# Patient Record
Sex: MALE | Race: WHITE | NOT HISPANIC OR LATINO | Employment: FULL TIME | URBAN - METROPOLITAN AREA
[De-identification: names, ages, dates, MRNs, and addresses within clinical notes are randomized per-mention and may not be internally consistent; named-entity substitution may affect disease eponyms.]

---
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
## 2019-11-01 ENCOUNTER — OFFICE VISIT (OUTPATIENT)
Dept: FAMILY MEDICINE CLINIC | Facility: CLINIC | Age: 19
End: 2019-11-01

## 2019-11-01 VITALS
SYSTOLIC BLOOD PRESSURE: 154 MMHG | DIASTOLIC BLOOD PRESSURE: 50 MMHG | BODY MASS INDEX: 27.19 KG/M2 | HEART RATE: 80 BPM | TEMPERATURE: 97.7 F | WEIGHT: 235 LBS | RESPIRATION RATE: 16 BRPM | HEIGHT: 78 IN

## 2019-11-01 DIAGNOSIS — R09.89 WIDENED PULSE PRESSURE: ICD-10-CM

## 2019-11-01 DIAGNOSIS — I10 HYPERTENSION, UNSPECIFIED TYPE: Primary | ICD-10-CM

## 2019-11-01 PROCEDURE — 99214 OFFICE O/P EST MOD 30 MIN: CPT | Performed by: FAMILY MEDICINE

## 2019-11-01 RX ORDER — LISINOPRIL 10 MG/1
10 TABLET ORAL DAILY
Qty: 90 TABLET | Refills: 1 | Status: SHIPPED | OUTPATIENT
Start: 2019-11-01 | End: 2019-11-25

## 2019-11-01 NOTE — PROGRESS NOTES
Chief Complaint   Patient presents with   • Hypertension     HPI  Ashok Vinson is a 19 y.o. male that presents for hypertension. Elevated BP had been noted by neurologist and he was told to monitor this. He has been taking his BP at home and is typically 150-160/60-70. He is otherwise asymptomatic and has no complaints. Maternal grandmother w/ high blood pressure.     Review of Systems   Constitutional: Negative for chills, fever and unexpected weight loss.   HENT: Negative for congestion, rhinorrhea and sore throat.    Eyes: Negative for blurred vision, double vision and visual disturbance.   Respiratory: Negative for cough and shortness of breath.    Cardiovascular: Negative for chest pain, palpitations and leg swelling.   Gastrointestinal: Negative for abdominal pain, constipation, diarrhea, nausea and vomiting.   Endocrine: Negative for cold intolerance and heat intolerance.   Genitourinary: Negative for dysuria, frequency and urgency.   Musculoskeletal: Negative for arthralgias and joint swelling.   Skin: Negative for rash and skin lesions.   Allergic/Immunologic: Negative for environmental allergies and food allergies.   Neurological: Negative for weakness and headache.   Hematological: Negative for adenopathy. Does not bruise/bleed easily.   Psychiatric/Behavioral: Negative for depressed mood. The patient is not nervous/anxious.      The following portions of the patient's history were reviewed and updated as appropriate: problem list, past medical history, past surgical history, allergies, current medications, past social history and past family history.    Problem List Tab  Patient History Tab  Immunizations Tab  Medications Tab  Chart Review Tab  Care Everywhere Tab  Synopsis Tab    PE  Vitals:    11/01/19 1628   BP: 154/50   Pulse: 80   Resp: 16   Temp: 97.7 °F (36.5 °C)     General: Well nourished, NAD  Head: AT/NC  Eyes: EOMI, anicteric sclera  ENT: MMM w/o erythema  Neck: Supple  Resp: CTAB, SCR,  BS equal  CV: RRR w/o m/r/g; 2+ pulses. No abdominal bruit  GI: Soft, NT/ND, +BS  MSK: FROM, no deformity, no edema  Skin: Warm, dry, intact. Numerous cafe au lait spots  Neuro: Alert and oriented. No focal deficits  Psych: Appropriate mood and affect    Imaging  No Images in the past 120 days found..    Assessment/Plan   Ashok Vinson is a 19 y.o. male that presents for   Chief Complaint   Patient presents with   • Hypertension     Ashok was seen today for hypertension.    Diagnoses and all orders for this visit:    Hypertension, unspecified type: Blood pressure 154/50 today.  This could represent essential hypertension.  However with the patient's history of NF1, he is at increased risk for renal artery stenosis and pheochromocytoma.  We will further assess for these issues.  We will also obtain BMP today as there is no baseline creatinine on file nor electrolytes.  -     lisinopril (PRINIVIL,ZESTRIL) 10 MG tablet; Take 1 tablet by mouth Daily.  -     Metanephrines, Pheochromocyt - Urine, Clean Catch  -     Basic Metabolic Panel  -     Duplex Renal Artery - Bilateral Complete CAR    Widened pulse pressure: Blood pressure 154/50 today with pulse pressure greater than 100, which is unusual for age.  Unclear etiology.  Briefly discussed with cardiology and feel that baseline echocardiogram would be appropriate.  -     Adult Transthoracic Echo Complete W/ Cont if Necessary Per Protocol; Future    F/U BP check in 1 week

## 2019-11-08 ENCOUNTER — HOSPITAL ENCOUNTER (OUTPATIENT)
Dept: CARDIOLOGY | Facility: HOSPITAL | Age: 19
Discharge: HOME | End: 2019-11-08
Admitting: FAMILY MEDICINE

## 2019-11-08 ENCOUNTER — HOSPITAL ENCOUNTER (OUTPATIENT)
Dept: CARDIOLOGY | Facility: HOSPITAL | Age: 19
Discharge: HOME | End: 2019-11-08

## 2019-11-08 VITALS
SYSTOLIC BLOOD PRESSURE: 172 MMHG | DIASTOLIC BLOOD PRESSURE: 69 MMHG | WEIGHT: 235 LBS | HEIGHT: 77 IN | BODY MASS INDEX: 27.75 KG/M2

## 2019-11-08 DIAGNOSIS — R09.89 WIDENED PULSE PRESSURE: ICD-10-CM

## 2019-11-08 LAB
BH CV ECHO MEAS - ACS: 2.4 CM
BH CV ECHO MEAS - AO MAX PG (FULL): 3.8 MMHG
BH CV ECHO MEAS - AO MAX PG: 5.8 MMHG
BH CV ECHO MEAS - AO MEAN PG (FULL): 2.5 MMHG
BH CV ECHO MEAS - AO MEAN PG: 3.5 MMHG
BH CV ECHO MEAS - AO ROOT AREA (BSA CORRECTED): 1.3
BH CV ECHO MEAS - AO ROOT AREA: 7.8 CM^2
BH CV ECHO MEAS - AO ROOT DIAM: 3.1 CM
BH CV ECHO MEAS - AO V2 MAX: 120.1 CM/SEC
BH CV ECHO MEAS - AO V2 MEAN: 88.7 CM/SEC
BH CV ECHO MEAS - AO V2 VTI: 22.3 CM
BH CV ECHO MEAS - AORTIC HR: 236.2 BPM
BH CV ECHO MEAS - AORTIC R-R: 0.25 SEC
BH CV ECHO MEAS - ASC AORTA: 2.5 CM
BH CV ECHO MEAS - AVA(I,A): 3.8 CM^2
BH CV ECHO MEAS - AVA(I,D): 3.8 CM^2
BH CV ECHO MEAS - AVA(V,A): 3.9 CM^2
BH CV ECHO MEAS - AVA(V,D): 3.9 CM^2
BH CV ECHO MEAS - BSA(HAYCOCK): 2.4 M^2
BH CV ECHO MEAS - BSA: 2.4 M^2
BH CV ECHO MEAS - BZI_BMI: 27.9 KILOGRAMS/M^2
BH CV ECHO MEAS - BZI_METRIC_HEIGHT: 195.6 CM
BH CV ECHO MEAS - BZI_METRIC_WEIGHT: 106.6 KG
BH CV ECHO MEAS - CI(AO): 17.1 L/MIN/M^2
BH CV ECHO MEAS - CI(LVOT): 8.3 L/MIN/M^2
BH CV ECHO MEAS - CO(AO): 41 L/MIN
BH CV ECHO MEAS - CO(LVOT): 19.9 L/MIN
BH CV ECHO MEAS - DIST REN A PSV LEFT: 98 CM/S
BH CV ECHO MEAS - EDV(CUBED): 132.3 ML
BH CV ECHO MEAS - EDV(MOD-SP2): 159.9 ML
BH CV ECHO MEAS - EDV(MOD-SP4): 170.2 ML
BH CV ECHO MEAS - EDV(TEICH): 123.6 ML
BH CV ECHO MEAS - EF(CUBED): 55.7 %
BH CV ECHO MEAS - EF(MOD-BP): 48 %
BH CV ECHO MEAS - EF(MOD-SP2): 45.2 %
BH CV ECHO MEAS - EF(MOD-SP4): 55.1 %
BH CV ECHO MEAS - EF(TEICH): 47.1 %
BH CV ECHO MEAS - ESV(CUBED): 58.7 ML
BH CV ECHO MEAS - ESV(MOD-SP2): 87.6 ML
BH CV ECHO MEAS - ESV(MOD-SP4): 76.4 ML
BH CV ECHO MEAS - ESV(TEICH): 65.3 ML
BH CV ECHO MEAS - FS: 23.7 %
BH CV ECHO MEAS - IVS/LVPW: 1
BH CV ECHO MEAS - IVSD: 1.1 CM
BH CV ECHO MEAS - LA DIMENSION(2D): 3.7 CM
BH CV ECHO MEAS - LV DIASTOLIC VOL/BSA (35-75): 71.1 ML/M^2
BH CV ECHO MEAS - LV MASS(C)D: 217.3 GRAMS
BH CV ECHO MEAS - LV MASS(C)DI: 90.7 GRAMS/M^2
BH CV ECHO MEAS - LV MAX PG: 1.9 MMHG
BH CV ECHO MEAS - LV MEAN PG: 1 MMHG
BH CV ECHO MEAS - LV SYSTOLIC VOL/BSA (12-30): 31.9 ML/M^2
BH CV ECHO MEAS - LV V1 MAX: 69.6 CM/SEC
BH CV ECHO MEAS - LV V1 MEAN: 47.6 CM/SEC
BH CV ECHO MEAS - LV V1 VTI: 12.6 CM
BH CV ECHO MEAS - LVIDD: 5.1 CM
BH CV ECHO MEAS - LVIDS: 3.9 CM
BH CV ECHO MEAS - LVOT AREA: 6.7 CM^2
BH CV ECHO MEAS - LVOT DIAM: 2.9 CM
BH CV ECHO MEAS - LVPWD: 1.1 CM
BH CV ECHO MEAS - MID REN A PSV LEFT: 85 CM/S
BH CV ECHO MEAS - MV A MAX VEL: 52.1 CM/SEC
BH CV ECHO MEAS - MV DEC SLOPE: 327 CM/SEC^2
BH CV ECHO MEAS - MV DEC TIME: 0.21 SEC
BH CV ECHO MEAS - MV E MAX VEL: 68.9 CM/SEC
BH CV ECHO MEAS - MV E/A: 1.3
BH CV ECHO MEAS - MV MAX PG: 2.5 MMHG
BH CV ECHO MEAS - MV MEAN PG: 0.72 MMHG
BH CV ECHO MEAS - MV V2 MAX: 78.7 CM/SEC
BH CV ECHO MEAS - MV V2 MEAN: 36.4 CM/SEC
BH CV ECHO MEAS - MV V2 VTI: 14.6 CM
BH CV ECHO MEAS - MVA(VTI): 5.8 CM^2
BH CV ECHO MEAS - PA ACC TIME: 0.08 SEC
BH CV ECHO MEAS - PA MAX PG (FULL): 1.9 MMHG
BH CV ECHO MEAS - PA MAX PG: 5.4 MMHG
BH CV ECHO MEAS - PA PR(ACCEL): 43 MMHG
BH CV ECHO MEAS - PA V2 MAX: 116.7 CM/SEC
BH CV ECHO MEAS - PROX REN A PSV LEFT: 110 CM/S
BH CV ECHO MEAS - PULM A REVS VEL: 27.9 CM/SEC
BH CV ECHO MEAS - PULM DIAS VEL: 52 CM/SEC
BH CV ECHO MEAS - PULM S/D: 0.84
BH CV ECHO MEAS - PULM SYS VEL: 43.6 CM/SEC
BH CV ECHO MEAS - PVA(V,A): 4.4 CM^2
BH CV ECHO MEAS - PVA(V,D): 4.4 CM^2
BH CV ECHO MEAS - QP/QS: 1.3
BH CV ECHO MEAS - RAP SYSTOLE: 8 MMHG
BH CV ECHO MEAS - RV MAX PG: 3.6 MMHG
BH CV ECHO MEAS - RV MEAN PG: 1.5 MMHG
BH CV ECHO MEAS - RV V1 MAX: 94.7 CM/SEC
BH CV ECHO MEAS - RV V1 MEAN: 56.2 CM/SEC
BH CV ECHO MEAS - RV V1 VTI: 19.7 CM
BH CV ECHO MEAS - RVDD: 3.1 CM
BH CV ECHO MEAS - RVOT AREA: 5.4 CM^2
BH CV ECHO MEAS - RVOT DIAM: 2.6 CM
BH CV ECHO MEAS - RVSP: 25.5 MMHG
BH CV ECHO MEAS - SI(AO): 72.5 ML/M^2
BH CV ECHO MEAS - SI(CUBED): 30.7 ML/M^2
BH CV ECHO MEAS - SI(LVOT): 35.2 ML/M^2
BH CV ECHO MEAS - SI(MOD-SP2): 30.2 ML/M^2
BH CV ECHO MEAS - SI(MOD-SP4): 39.2 ML/M^2
BH CV ECHO MEAS - SI(TEICH): 24.3 ML/M^2
BH CV ECHO MEAS - SV(AO): 173.6 ML
BH CV ECHO MEAS - SV(CUBED): 73.6 ML
BH CV ECHO MEAS - SV(LVOT): 84.2 ML
BH CV ECHO MEAS - SV(MOD-SP2): 72.3 ML
BH CV ECHO MEAS - SV(MOD-SP4): 93.9 ML
BH CV ECHO MEAS - SV(RVOT): 105.8 ML
BH CV ECHO MEAS - SV(TEICH): 58.2 ML
BH CV ECHO MEAS - TR MAX VEL: 209 CM/SEC
BH CV VAS RENAL AORTIC MID PSV: 109 CM/S
BH CV XLRA MEAS - KID L LEFT: 10.9 CM
BH CV XLRA MEAS DIST REN A PSV RIGHT: 198 CM/S
BH CV XLRA MEAS KID L RIGHT: 11.3 CM
BH CV XLRA MEAS MID REN A PSV RIGHT: 88 CM/S
BH CV XLRA MEAS PROX REN A PSV RIGHT: 71 CM/S
LV EF 2D ECHO EST: 60 %

## 2019-11-08 PROCEDURE — 93306 TTE W/DOPPLER COMPLETE: CPT | Performed by: INTERNAL MEDICINE

## 2019-11-08 PROCEDURE — 93975 VASCULAR STUDY: CPT

## 2019-11-08 PROCEDURE — 0399T HC MYOCARDL STRAIN IMAG QUAN ASSMT PER SESS: CPT

## 2019-11-08 PROCEDURE — 93306 TTE W/DOPPLER COMPLETE: CPT

## 2019-11-18 PROCEDURE — 83835 ASSAY OF METANEPHRINES: CPT | Performed by: FAMILY MEDICINE

## 2019-11-18 PROCEDURE — 80048 BASIC METABOLIC PNL TOTAL CA: CPT | Performed by: FAMILY MEDICINE

## 2019-11-18 PROCEDURE — 82570 ASSAY OF URINE CREATININE: CPT | Performed by: FAMILY MEDICINE

## 2019-11-19 LAB
ANION GAP SERPL CALCULATED.3IONS-SCNC: 10.8 MMOL/L (ref 5–15)
BUN BLD-MCNC: 16 MG/DL (ref 6–20)
BUN/CREAT SERPL: 20.3 (ref 7–25)
CALCIUM SPEC-SCNC: 9.3 MG/DL (ref 8.6–10.5)
CHLORIDE SERPL-SCNC: 104 MMOL/L (ref 98–107)
CO2 SERPL-SCNC: 27.2 MMOL/L (ref 22–29)
CREAT BLD-MCNC: 0.79 MG/DL (ref 0.76–1.27)
GFR SERPL CREATININE-BSD FRML MDRD: 124 ML/MIN/1.73
GLUCOSE BLD-MCNC: 56 MG/DL (ref 65–99)
POTASSIUM BLD-SCNC: 4.1 MMOL/L (ref 3.5–5.2)
SODIUM BLD-SCNC: 142 MMOL/L (ref 136–145)

## 2019-11-20 ENCOUNTER — TELEPHONE (OUTPATIENT)
Dept: FAMILY MEDICINE CLINIC | Facility: CLINIC | Age: 19
End: 2019-11-20

## 2019-11-20 NOTE — TELEPHONE ENCOUNTER
VM MESSAGE.  Марина Vinson, stepmother, called and is asking if you've had time to review patient's renal doppler he had done 1.5 weeks ago. She states it shows renal artery stenosis. She is asking if you would order an MRA renal arteries to have done ASAP so that you would have results for patient's appointment with you next Monday, 11/25/19.

## 2019-11-25 ENCOUNTER — OFFICE VISIT (OUTPATIENT)
Dept: FAMILY MEDICINE CLINIC | Facility: CLINIC | Age: 19
End: 2019-11-25

## 2019-11-25 VITALS
SYSTOLIC BLOOD PRESSURE: 156 MMHG | BODY MASS INDEX: 28.46 KG/M2 | HEIGHT: 77 IN | HEART RATE: 76 BPM | TEMPERATURE: 98.1 F | RESPIRATION RATE: 16 BRPM | WEIGHT: 241 LBS | DIASTOLIC BLOOD PRESSURE: 58 MMHG

## 2019-11-25 DIAGNOSIS — I15.0 RENOVASCULAR HYPERTENSION: Primary | ICD-10-CM

## 2019-11-25 DIAGNOSIS — I10 HYPERTENSION, UNSPECIFIED TYPE: ICD-10-CM

## 2019-11-25 LAB
CREAT 24H UR-MCNC: 154 MG/DL
METANEPH/CREAT UR: 0.3 UG/MG CREAT (ref 0–1)
METANEPHS UR-MCNC: 83 UG/L
NORMETANEPHRINE 24H UR-MCNC: 290 UG/L

## 2019-11-25 PROCEDURE — 99214 OFFICE O/P EST MOD 30 MIN: CPT | Performed by: FAMILY MEDICINE

## 2019-11-25 RX ORDER — LISINOPRIL 10 MG/1
20 TABLET ORAL DAILY
Qty: 90 TABLET | Refills: 1 | Status: SHIPPED | OUTPATIENT
Start: 2019-11-25 | End: 2019-12-20 | Stop reason: SDUPTHER

## 2019-11-25 NOTE — PROGRESS NOTES
Chief Complaint   Patient presents with   • Hypertension     HPI  Ashok Vinson is a 19 y.o. male that presents for follow-up of hypertension.  Patient has been compliant with lisinopril 10 mg daily.  His repeat blood pressure today is 156/58.  Patient's only concern today are questions regarding his renal duplex results as well as echocardiogram.  Results explained see discussion below      Review of Systems   Constitutional: Negative for chills, fever and unexpected weight loss.   HENT: Negative for congestion, rhinorrhea and sore throat.    Eyes: Negative for blurred vision, double vision and visual disturbance.   Respiratory: Negative for cough and shortness of breath.    Cardiovascular: Negative for chest pain, palpitations and leg swelling.   Gastrointestinal: Negative for abdominal pain, constipation, diarrhea, nausea and vomiting.   Endocrine: Negative for cold intolerance and heat intolerance.   Genitourinary: Negative for dysuria, frequency and urgency.   Musculoskeletal: Negative for arthralgias and joint swelling.   Skin: Negative for rash and skin lesions.   Allergic/Immunologic: Negative for environmental allergies and food allergies.   Neurological: Negative for weakness and headache.   Hematological: Negative for adenopathy. Does not bruise/bleed easily.   Psychiatric/Behavioral: Negative for depressed mood. The patient is not nervous/anxious.      The following portions of the patient's history were reviewed and updated as appropriate: problem list, past medical history, past surgical history, allergies, current medications, past social history and past family history.    Problem List Tab  Patient History Tab  Immunizations Tab  Medications Tab  Chart Review Tab  Care Everywhere Tab  Synopsis Tab    PE  Vitals:    11/25/19 1610   BP: 156/58   Pulse: 76   Resp: 16   Temp: 98.1 °F (36.7 °C)     Body mass index is 28.58 kg/m².  General: Well nourished, NAD  Head: AT/NC  Eyes: EOMI, anicteric  sclera  ENT: MMM w/o erythema  Neck: Supple, no thyromegaly or LAD  Resp: CTAB, SCR, BS equal  CV: RRR w/o m/r/g; 2+ pulses  GI: Soft, NT/ND, +BS  MSK: FROM, no deformity, no edema  Skin: Warm, dry, intact  Neuro: Alert and oriented. No focal deficits  Psych: Appropriate mood and affect    Imaging  No Images in the past 120 days found..    Assessment/Plan   Ashok Vinson is a 19 y.o. male that presents for   Chief Complaint   Patient presents with   • Hypertension     Ashok was seen today for hypertension.    Diagnoses and all orders for this visit:    Renovascular hypertension: Blood pressure persistently elevated to 156/58 today.  Results of echocardiogram,  renal duplex, and lab work reviewed and explained extensively.  Kenroy joins Ashok today who works in interventional radiology.  She prefers to have me discuss Ashok's case with one of the interventional radiology physicians that she knows and trusts.  I would like to discuss with interventional radiology as well as potentially obtain the opinion of vascular surgery.  In my reading, it appears that patient's such as Tiago should have a one-time MRA from head to pelvis.  I would like a specialist opinion on whether this imaging should be performed.  I would also like their opinion regarding medical management versus surgical intervention.  We will plan to discuss with interventional radiology tomorrow and formally consult vascular surgery for another opinion.   - Increase lisinopril to 20mg daily   - Referral placed for vascular surgery    - Appreciate opinion as discussed above   - Will discuss case w/ IR    F/U in 1-2 weeks for BP check  F/U appt in 2-3 months w/ me

## 2019-12-03 ENCOUNTER — TELEPHONE (OUTPATIENT)
Dept: FAMILY MEDICINE CLINIC | Facility: CLINIC | Age: 19
End: 2019-12-03

## 2019-12-03 DIAGNOSIS — I70.1 RENAL ARTERY STENOSIS (HCC): Primary | ICD-10-CM

## 2019-12-03 NOTE — TELEPHONE ENCOUNTER
VM MESSAGE.  Марина Echols called asking for status MRA abdomen ordered for patient. She can be reached at [phone] or [phone]. Thank you.

## 2019-12-03 NOTE — TELEPHONE ENCOUNTER
S/W Марина and she states JOSE Hardy MD, had called Dr. Kovacs last Wednesday and she was under the impression an MRA abdomen was going to be ordered. Dr. Kovacs, do you want to proceed with ordering this test? Thank you.

## 2019-12-03 NOTE — TELEPHONE ENCOUNTER
Alla,  She didn't have a MRA ordered the only order that I had for her was to a Vascular Surgeon. So im not sure if she is needing this not sure.

## 2019-12-04 ENCOUNTER — TELEPHONE (OUTPATIENT)
Dept: FAMILY MEDICINE CLINIC | Facility: CLINIC | Age: 19
End: 2019-12-04

## 2019-12-04 DIAGNOSIS — I70.1 RENAL ARTERY STENOSIS (HCC): Primary | ICD-10-CM

## 2019-12-04 NOTE — TELEPHONE ENCOUNTER
Kinza, can you let me know when authorization is approved so I can let Марина Vinson know? Thanks for your help!

## 2019-12-04 NOTE — TELEPHONE ENCOUNTER
Alla, I have gotten authorization and I am sending the order over to the hospital and scheduling will contact patient and schedule appt.       Thanks, Kinza

## 2019-12-06 ENCOUNTER — HOSPITAL ENCOUNTER (OUTPATIENT)
Dept: MRI IMAGING | Facility: HOSPITAL | Age: 19
Discharge: HOME | End: 2019-12-06
Admitting: FAMILY MEDICINE

## 2019-12-06 DIAGNOSIS — I70.1 RENAL ARTERY STENOSIS (HCC): ICD-10-CM

## 2019-12-06 PROCEDURE — 25010000002 GADOTERIDOL PER 1 ML: Performed by: FAMILY MEDICINE

## 2019-12-06 PROCEDURE — C8902 MRA W/O FOL W/CONT, ABD: HCPCS

## 2019-12-06 PROCEDURE — A9576 INJ PROHANCE MULTIPACK: HCPCS | Performed by: FAMILY MEDICINE

## 2019-12-06 RX ADMIN — GADOTERIDOL 20 ML: 279.3 INJECTION, SOLUTION INTRAVENOUS at 14:15

## 2019-12-11 ENCOUNTER — TELEPHONE (OUTPATIENT)
Dept: FAMILY MEDICINE CLINIC | Facility: CLINIC | Age: 19
End: 2019-12-11

## 2019-12-11 NOTE — TELEPHONE ENCOUNTER
Called multiple times and left message. Indicated no surgical intervention needed. Will medically manage and to please f/u w/ BP readings so meds can be adjusted

## 2019-12-19 ENCOUNTER — TELEPHONE (OUTPATIENT)
Dept: FAMILY MEDICINE CLINIC | Facility: CLINIC | Age: 19
End: 2019-12-19

## 2019-12-19 NOTE — TELEPHONE ENCOUNTER
VM MESSAGE.  shaquille Parish, states patient's BP is running 150's/70's. He is currently on Lisinopril 20mg. She is wondering if it should be increased to 40mg and, if so, please send new RX in. Thank you.

## 2019-12-20 DIAGNOSIS — I10 HYPERTENSION, UNSPECIFIED TYPE: ICD-10-CM

## 2019-12-20 RX ORDER — LISINOPRIL 10 MG/1
10 TABLET ORAL DAILY
Qty: 90 TABLET | Refills: 1 | Status: SHIPPED | OUTPATIENT
Start: 2019-11-01 | End: 2020-01-24 | Stop reason: ALTCHOICE

## 2019-12-24 ENCOUNTER — TRANSCRIBE ORDERS (OUTPATIENT)
Dept: ADMINISTRATIVE | Facility: HOSPITAL | Age: 19
End: 2019-12-24

## 2019-12-24 DIAGNOSIS — R51.9 ACUTE INTRACTABLE HEADACHE, UNSPECIFIED HEADACHE TYPE: Primary | ICD-10-CM

## 2019-12-24 DIAGNOSIS — I10 HYPERTENSION, UNSPECIFIED TYPE: ICD-10-CM

## 2019-12-30 ENCOUNTER — APPOINTMENT (OUTPATIENT)
Dept: MRI IMAGING | Facility: HOSPITAL | Age: 19
End: 2019-12-30

## 2019-12-30 ENCOUNTER — HOSPITAL ENCOUNTER (OUTPATIENT)
Dept: MRI IMAGING | Facility: HOSPITAL | Age: 19
Discharge: HOME | End: 2019-12-30
Admitting: MEDICAL GENETICS

## 2019-12-30 DIAGNOSIS — I10 HYPERTENSION, UNSPECIFIED TYPE: ICD-10-CM

## 2019-12-30 DIAGNOSIS — R51.9 ACUTE INTRACTABLE HEADACHE, UNSPECIFIED HEADACHE TYPE: ICD-10-CM

## 2019-12-30 LAB — CREAT BLDA-MCNC: 0.8 MG/DL (ref 0.6–1.3)

## 2019-12-30 PROCEDURE — 70553 MRI BRAIN STEM W/O & W/DYE: CPT

## 2019-12-30 PROCEDURE — A9576 INJ PROHANCE MULTIPACK: HCPCS | Performed by: MEDICAL GENETICS

## 2019-12-30 PROCEDURE — 25010000002 GADOTERIDOL PER 1 ML: Performed by: MEDICAL GENETICS

## 2019-12-30 PROCEDURE — 82565 ASSAY OF CREATININE: CPT

## 2019-12-30 RX ADMIN — GADOTERIDOL 20 ML: 279.3 INJECTION, SOLUTION INTRAVENOUS at 17:18

## 2020-01-24 ENCOUNTER — OFFICE VISIT (OUTPATIENT)
Dept: FAMILY MEDICINE CLINIC | Facility: CLINIC | Age: 20
End: 2020-01-24

## 2020-01-24 VITALS
HEART RATE: 76 BPM | TEMPERATURE: 98.3 F | BODY MASS INDEX: 28.69 KG/M2 | RESPIRATION RATE: 16 BRPM | DIASTOLIC BLOOD PRESSURE: 54 MMHG | SYSTOLIC BLOOD PRESSURE: 132 MMHG | HEIGHT: 77 IN | WEIGHT: 243 LBS

## 2020-01-24 DIAGNOSIS — I10 ESSENTIAL HYPERTENSION: Primary | ICD-10-CM

## 2020-01-24 PROCEDURE — 99213 OFFICE O/P EST LOW 20 MIN: CPT | Performed by: FAMILY MEDICINE

## 2020-01-24 RX ORDER — AMLODIPINE BESYLATE 5 MG/1
5 TABLET ORAL DAILY
Qty: 90 TABLET | Refills: 3 | Status: SHIPPED | OUTPATIENT
Start: 2020-01-24 | End: 2020-11-11

## 2020-01-24 RX ORDER — LISINOPRIL 40 MG/1
40 TABLET ORAL DAILY
COMMUNITY
Start: 2019-12-20 | End: 2020-03-26 | Stop reason: SDUPTHER

## 2020-01-24 NOTE — PROGRESS NOTES
Chief Complaint   Patient presents with   • Hypertension     2mo     HPI  Ashok Vinson is a 19 y.o. male that presents for HTN f/u.    Patient presents for HTN follow-up. Since last visit, he had MRA abd (renal artery) that was negative for renal artery stenosis. We have subsequently been managing his HTN medically. His lisinopril has been increased to 40mg daily. BP today 132/54    Review of Systems   Constitutional: Negative for fever and unexpected weight loss.   HENT: Negative for congestion.    Eyes: Negative for visual disturbance.   Respiratory: Negative for shortness of breath.    Cardiovascular: Negative for chest pain.   Gastrointestinal: Negative for abdominal pain.   Neurological: Negative for weakness and headache.     The following portions of the patient's history were reviewed and updated as appropriate: problem list, past medical history, past surgical history, allergies, current medications    Problem List Tab  Patient History Tab  Immunizations Tab  Medications Tab  Chart Review Tab  Care Everywhere Tab  Synopsis Tab    PE  Vitals:    01/24/20 1515   BP: 132/54   Pulse: 76   Resp: 16   Temp: 98.3 °F (36.8 °C)     Body mass index is 28.82 kg/m².  General: Well nourished, NAD  Head: AT/NC  Eyes: EOMI, anicteric sclera  Resp: CTAB, SCR, BS equal  CV: RRR w/o m/r/g; 2+ pulses  GI: Soft, NT/ND, +BS  MSK: FROM, no deformity, no edema  Skin: Warm, dry, intact  Neuro: Alert and oriented. No focal deficits  Psych: Appropriate mood and affect    Imaging  Mri Brain With & Without Contrast    Result Date: 12/30/2019  Normal brain MRI examination.  No abnormal enhancement.   Electronically Signed By-Carrol Laboy On:12/30/2019 6:42 PM This report was finalized on [id number] by  Carrol Laboy, .    Mri Angiogram Abdomen With & Without Contrast    Result Date: 12/7/2019  IMPRESSION :  1. No angiographically significant renal artery stenosis demonstrated on either side. Please see above discussion for  specific details regarding the renal arteries bilaterally. 2. Retro-aortic left iliac vein, representing a variation of normal. 3. 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 4. Numerous additional findings as described above in great detail.  Electronically Signed By-Trace Trinidad On:12/7/2019 12:03 PM This report was finalized on [id number] by  Trace Trinidad, .      Assessment/Plan   Ashok Vinson is a 19 y.o. male that presents for   Chief Complaint   Patient presents with   • Hypertension     2mo     Ashok was seen today for hypertension.    Diagnoses and all orders for this visit:    Essential hypertension: 132/54 today- would prefer to be slightly better controlled  -     Start amLODIPine (NORVASC) 5 MG tablet; Take 1 tablet by mouth Daily.  - Cont home lisinopril 40    F/U in 6 months for annual physical

## 2020-03-26 RX ORDER — LISINOPRIL 40 MG/1
40 TABLET ORAL DAILY
Qty: 90 TABLET | Refills: 1 | Status: SHIPPED | OUTPATIENT
Start: 2020-03-26 | End: 2020-09-29

## 2020-03-26 NOTE — TELEPHONE ENCOUNTER
Patient is calling for a refill for the following medication:    lisinopril (PRINIVIL,ZESTRIL) 40 MG tablet    Laquita Penn at [facility] confirmed    Patient call back [phone]

## 2020-07-17 ENCOUNTER — OFFICE VISIT (OUTPATIENT)
Dept: FAMILY MEDICINE CLINIC | Facility: CLINIC | Age: 20
End: 2020-07-17

## 2020-07-17 VITALS
OXYGEN SATURATION: 99 % | SYSTOLIC BLOOD PRESSURE: 154 MMHG | RESPIRATION RATE: 18 BRPM | TEMPERATURE: 97.8 F | HEART RATE: 97 BPM | DIASTOLIC BLOOD PRESSURE: 64 MMHG | WEIGHT: 240 LBS | BODY MASS INDEX: 28.46 KG/M2

## 2020-07-17 DIAGNOSIS — I10 HYPERTENSION, UNSPECIFIED TYPE: Primary | ICD-10-CM

## 2020-07-17 PROCEDURE — 99214 OFFICE O/P EST MOD 30 MIN: CPT | Performed by: PHYSICIAN ASSISTANT

## 2020-07-17 RX ORDER — AMLODIPINE BESYLATE 10 MG/1
10 TABLET ORAL DAILY
Qty: 90 TABLET | Refills: 3 | Status: SHIPPED | OUTPATIENT
Start: 2020-07-17 | End: 2021-08-02 | Stop reason: SDUPTHER

## 2020-07-23 ENCOUNTER — LAB (OUTPATIENT)
Dept: FAMILY MEDICINE CLINIC | Facility: CLINIC | Age: 20
End: 2020-07-23

## 2020-07-23 DIAGNOSIS — I10 HYPERTENSION, UNSPECIFIED TYPE: ICD-10-CM

## 2020-07-23 LAB
ANION GAP SERPL CALCULATED.3IONS-SCNC: 10.9 MMOL/L (ref 5–15)
BUN SERPL-MCNC: 17 MG/DL (ref 6–20)
BUN/CREAT SERPL: 17.9 (ref 7–25)
CALCIUM SPEC-SCNC: 9.7 MG/DL (ref 8.6–10.5)
CHLORIDE SERPL-SCNC: 99 MMOL/L (ref 98–107)
CO2 SERPL-SCNC: 25.1 MMOL/L (ref 22–29)
CREAT SERPL-MCNC: 0.95 MG/DL (ref 0.76–1.27)
GFR SERPL CREATININE-BSD FRML MDRD: 99 ML/MIN/1.73
GLUCOSE SERPL-MCNC: 66 MG/DL (ref 65–99)
POTASSIUM SERPL-SCNC: 4.7 MMOL/L (ref 3.5–5.2)
SODIUM SERPL-SCNC: 135 MMOL/L (ref 136–145)
TSH SERPL DL<=0.05 MIU/L-ACNC: 1.4 UIU/ML (ref 0.27–4.2)

## 2020-07-23 PROCEDURE — 80048 BASIC METABOLIC PNL TOTAL CA: CPT | Performed by: PHYSICIAN ASSISTANT

## 2020-07-23 PROCEDURE — 84244 ASSAY OF RENIN: CPT | Performed by: PHYSICIAN ASSISTANT

## 2020-07-23 PROCEDURE — 36415 COLL VENOUS BLD VENIPUNCTURE: CPT

## 2020-07-23 PROCEDURE — 82088 ASSAY OF ALDOSTERONE: CPT | Performed by: PHYSICIAN ASSISTANT

## 2020-07-23 PROCEDURE — 84443 ASSAY THYROID STIM HORMONE: CPT | Performed by: PHYSICIAN ASSISTANT

## 2020-07-27 ENCOUNTER — TELEPHONE (OUTPATIENT)
Dept: FAMILY MEDICINE CLINIC | Facility: CLINIC | Age: 20
End: 2020-07-27

## 2020-07-27 NOTE — TELEPHONE ENCOUNTER
----- Message from Ba Burgos PA-C sent at 7/24/2020  8:25 AM EDT -----  TSH and BMP are ok. We are still waiting for Renin and aldosterone.

## 2020-08-05 ENCOUNTER — TELEPHONE (OUTPATIENT)
Dept: FAMILY MEDICINE CLINIC | Facility: CLINIC | Age: 20
End: 2020-08-05

## 2020-08-05 NOTE — TELEPHONE ENCOUNTER
PATIENT'S MOTHER IS CALLING TO GET INFO ABOUT IS DAWSON BRICE FROM LAB WORK. SHE ALSO WANTED TO SPEAK TO DR. CANO ABOUT KIDNEY SPECIALIST CONSULT NEEDED; SHE WANTS HIM TO SEE .  PLEASE GIVE A CALL AS SOON AS POSSIBLE.      CALL MOTHER AT:  BEST PH#: [phone]

## 2020-08-06 ENCOUNTER — TELEPHONE (OUTPATIENT)
Dept: FAMILY MEDICINE CLINIC | Facility: CLINIC | Age: 20
End: 2020-08-06

## 2020-08-06 NOTE — TELEPHONE ENCOUNTER
----- Message from Ba Burgos PA-C sent at 8/6/2020  8:13 AM EDT -----  continue follow up for htn management with .

## 2020-09-29 RX ORDER — LISINOPRIL 40 MG/1
40 TABLET ORAL DAILY
Qty: 90 TABLET | Refills: 1 | Status: SHIPPED | OUTPATIENT
Start: 2020-09-29 | End: 2021-03-26

## 2020-11-11 ENCOUNTER — OFFICE VISIT (OUTPATIENT)
Dept: FAMILY MEDICINE CLINIC | Facility: CLINIC | Age: 20
End: 2020-11-11

## 2020-11-11 VITALS
OXYGEN SATURATION: 98 % | WEIGHT: 254 LBS | BODY MASS INDEX: 29.99 KG/M2 | SYSTOLIC BLOOD PRESSURE: 150 MMHG | DIASTOLIC BLOOD PRESSURE: 48 MMHG | RESPIRATION RATE: 16 BRPM | TEMPERATURE: 97.1 F | HEART RATE: 81 BPM | HEIGHT: 77 IN

## 2020-11-11 DIAGNOSIS — I10 ESSENTIAL HYPERTENSION: Primary | ICD-10-CM

## 2020-11-11 PROCEDURE — 99213 OFFICE O/P EST LOW 20 MIN: CPT | Performed by: FAMILY MEDICINE

## 2020-11-11 RX ORDER — HYDROCHLOROTHIAZIDE 25 MG/1
25 TABLET ORAL DAILY
Qty: 90 TABLET | Refills: 3 | Status: SHIPPED | OUTPATIENT
Start: 2020-11-11 | End: 2021-09-23

## 2020-11-11 NOTE — PROGRESS NOTES
Chief Complaint   Patient presents with   • Hypertension   • nose bleeds     HPI  Ashok Vinson is a 20 y.o. male that presents for HTN f/u    HTN: 150/48 today. Maintained on amlodipine 10 and lisinopril 40 daily. Patient reports BP as high as 163 at home. He has had a few recent nose bleeds. Interested in seeing Dr Maravilla     Review of Systems   Constitutional: Negative for chills, fever and unexpected weight loss.   HENT: Positive for nosebleeds. Negative for congestion.    Eyes: Negative for visual disturbance.   Respiratory: Negative for cough and shortness of breath.    Cardiovascular: Negative for chest pain and palpitations.   Gastrointestinal: Negative for abdominal pain and vomiting.   Genitourinary: Negative for dysuria.   Musculoskeletal: Negative for arthralgias.   Skin: Negative for rash.   Hematological: Bruises/bleeds easily.     The following portions of the patient's history were reviewed and updated as appropriate: problem list, past medical history, past surgical history, allergies, current medication    Problem List Tab  Patient History Tab  Immunizations Tab  Medications Tab  Chart Review Tab  Care Everywhere Tab  Synopsis Tab    PE  Vitals:    11/11/20 1359   BP: 150/48   Pulse: 81   Resp: 16   Temp: 97.1 °F (36.2 °C)   SpO2: 98%     Body mass index is 30.12 kg/m².  General: Well nourished, NAD  Head: AT/NC  Eyes: EOMI, anicteric sclera  Resp: CTAB, SCR, BS equal  CV: RRR w/o m/r/g; 2+ pulses  GI: Soft, NT/ND, +BS  MSK: FROM, no deformity, no edema  Skin: Warm, dry, intact  Neuro: Alert and oriented. No focal deficits  Psych: Appropriate mood and affect    Imaging  No Images in the past 120 days found..    Assessment/Plan   Ashok Vinson is a 20 y.o. male that presents for   Chief Complaint   Patient presents with   • Hypertension   • nose bleeds     Diagnoses and all orders for this visit:    1. Essential hypertension (Primary): 150/48 today.  Patient is interesting in seeing   Renita for elevated rate and and poorly controlled hypertension.  I think this is reasonable and would appreciate a second opinion.  To summarize, I initially saw Tiago about 1 year ago for complaint of hypertension.  At that time patient had a systolic blood pressure of approximately 150 with a pulse pressure of 100.  I found his widened pulse pressure alarming and ordered echo as well as renal artery ultrasound due to history of neurofibromatosis in association with renal stenosis.  His echo was reassuring but his ultrasound showed concern for renal artery stenosis.  Case was discussed with Dr. Trinidad of interventional radiology who recommended an MRA of the abdomen.  The study showed normal renal arteries bilaterally.  Patient was later seen by the PA in our office who ordered renin and aldosterone levels.  His renin returned rather elevated, which would be consistent with renal artery stenosis.  However, given his negative MRA, I am not sure what else to do aside from control blood pressure the best I can with his numerous medications.  May consider repeating MRA. Appreciate further recommendations from Dr. Maravilla  -     Ambulatory Referral to Nephrology  -     Start hydroCHLOROthiazide (HYDRODIURIL) 25 MG tablet; Take 1 tablet by mouth Daily.  Dispense: 90 tablet; Refill: 3  - Continue home lisinopril 40 daily and amlodipine 10 daily    Follow-up in 3 months for annual physical exam

## 2020-12-20 PROCEDURE — 82384 ASSAY THREE CATECHOLAMINES: CPT

## 2020-12-20 PROCEDURE — 82088 ASSAY OF ALDOSTERONE: CPT

## 2020-12-20 PROCEDURE — 81050 URINALYSIS VOLUME MEASURE: CPT

## 2020-12-20 PROCEDURE — 83835 ASSAY OF METANEPHRINES: CPT

## 2020-12-20 PROCEDURE — 84585 ASSAY OF URINE VMA: CPT

## 2020-12-20 PROCEDURE — 83497 ASSAY OF 5-HIAA: CPT

## 2020-12-21 ENCOUNTER — LAB (OUTPATIENT)
Dept: LAB | Facility: HOSPITAL | Age: 20
End: 2020-12-21

## 2020-12-21 ENCOUNTER — TRANSCRIBE ORDERS (OUTPATIENT)
Dept: ADMINISTRATIVE | Facility: HOSPITAL | Age: 20
End: 2020-12-21

## 2020-12-21 DIAGNOSIS — C7A.096 MALIGNANT CARCINOID TUMOR OF HINDGUT (HCC): Primary | ICD-10-CM

## 2020-12-21 DIAGNOSIS — C7A.096 MALIGNANT CARCINOID TUMOR OF HINDGUT (HCC): ICD-10-CM

## 2020-12-21 LAB — TSH SERPL DL<=0.05 MIU/L-ACNC: 1.26 UIU/ML (ref 0.27–4.2)

## 2020-12-21 PROCEDURE — 82088 ASSAY OF ALDOSTERONE: CPT

## 2020-12-21 PROCEDURE — 84244 ASSAY OF RENIN: CPT

## 2020-12-21 PROCEDURE — 83835 ASSAY OF METANEPHRINES: CPT

## 2020-12-21 PROCEDURE — 36415 COLL VENOUS BLD VENIPUNCTURE: CPT

## 2020-12-21 PROCEDURE — 84443 ASSAY THYROID STIM HORMONE: CPT

## 2020-12-27 LAB
5OH-INDOLEACETATE 24H UR-MCNC: 2.1 MG/L
5OH-INDOLEACETATE 24H UR-MRATE: 5.5 MG/24 HR (ref 0–14.9)
VMA 24H UR-MRATE: 7.3 MG/24 HR (ref 0–7.5)
VMA UR-MCNC: 2.8 MG/L

## 2020-12-28 LAB
METANEPH 24H UR-MRATE: 75 UG/24 HR (ref 58–276)
METANEPH FREE SERPL-MCNC: <10 PG/ML (ref 0–88)
METANEPHS 24H UR-MCNC: 29 UG/L
NORMETANEPHRINE 24H UR-MCNC: 137 UG/L
NORMETANEPHRINE 24H UR-MRATE: 356 UG/24 HR (ref 110–553)
NORMETANEPHRINE SERPL-MCNC: 14.2 PG/ML (ref 0–107.7)

## 2020-12-29 LAB
DOPAMINE 24H UR-MRATE: 465 UG/24 HR (ref 0–510)
DOPAMINE UR-MCNC: 179 UG/L
EPINEPH 24H UR-MRATE: 3 UG/24 HR (ref 0–20)
EPINEPH UR-MCNC: 1 UG/L
NOREPINEPH 24H UR-MRATE: 73 UG/24 HR (ref 0–135)
NOREPINEPH UR-MCNC: 28 UG/L
VMA 24H UR-MRATE: 7 MG/24 HR (ref 0–7.5)
VMA UR-MCNC: 2.7 MG/L

## 2021-01-02 LAB
ALDOST 24H UR-MRATE: <6.5 UG/24 HR (ref 0–19)
ALDOST UR-MCNC: <2.5 UG/L

## 2021-03-16 LAB
ALDOST SERPL-MCNC: <1 NG/DL (ref 0–30)
ALDOST SERPL-MCNC: <1 NG/DL (ref 0–30)
RENIN PLAS-CCNC: 18.43 NG/ML/HR (ref 0.17–5.38)
RENIN PLAS-CCNC: 23.79 NG/ML/HR (ref 0.17–5.38)

## 2021-03-26 RX ORDER — LISINOPRIL 40 MG/1
40 TABLET ORAL DAILY
Qty: 90 TABLET | Refills: 3 | Status: SHIPPED | OUTPATIENT
Start: 2021-03-26 | End: 2022-03-31 | Stop reason: SDUPTHER

## 2021-08-02 RX ORDER — AMLODIPINE BESYLATE 10 MG/1
10 TABLET ORAL DAILY
Qty: 90 TABLET | Refills: 3 | Status: CANCELLED | OUTPATIENT
Start: 2021-08-02

## 2021-08-03 RX ORDER — AMLODIPINE BESYLATE 10 MG/1
10 TABLET ORAL DAILY
Qty: 90 TABLET | Refills: 0 | Status: SHIPPED | OUTPATIENT
Start: 2021-08-03 | End: 2021-10-31 | Stop reason: SDUPTHER

## 2021-08-27 ENCOUNTER — TELEPHONE (OUTPATIENT)
Dept: FAMILY MEDICINE CLINIC | Facility: CLINIC | Age: 21
End: 2021-08-27

## 2021-08-27 NOTE — TELEPHONE ENCOUNTER
Caller: Ashok Vinson    Relationship to patient: Self    Best call back number: [phone]    Patient is needing: PATIENT CALLED AND SCHEDULED A PHYSICAL WITH ANTONELLA HUNT FOR 09/02/21

## 2021-09-01 ENCOUNTER — LAB (OUTPATIENT)
Dept: FAMILY MEDICINE CLINIC | Facility: CLINIC | Age: 21
End: 2021-09-01

## 2021-09-01 ENCOUNTER — OFFICE VISIT (OUTPATIENT)
Dept: FAMILY MEDICINE CLINIC | Facility: CLINIC | Age: 21
End: 2021-09-01

## 2021-09-01 VITALS
WEIGHT: 264 LBS | BODY MASS INDEX: 31.17 KG/M2 | DIASTOLIC BLOOD PRESSURE: 62 MMHG | SYSTOLIC BLOOD PRESSURE: 126 MMHG | OXYGEN SATURATION: 98 % | HEIGHT: 77 IN | RESPIRATION RATE: 16 BRPM | HEART RATE: 90 BPM

## 2021-09-01 DIAGNOSIS — Z00.00 ANNUAL PHYSICAL EXAM: ICD-10-CM

## 2021-09-01 DIAGNOSIS — Z00.00 ANNUAL PHYSICAL EXAM: Primary | ICD-10-CM

## 2021-09-01 DIAGNOSIS — M54.2 NECK PAIN: ICD-10-CM

## 2021-09-01 PROCEDURE — 36415 COLL VENOUS BLD VENIPUNCTURE: CPT

## 2021-09-01 PROCEDURE — 99395 PREV VISIT EST AGE 18-39: CPT | Performed by: NURSE PRACTITIONER

## 2021-09-01 PROCEDURE — 85025 COMPLETE CBC W/AUTO DIFF WBC: CPT | Performed by: NURSE PRACTITIONER

## 2021-09-01 PROCEDURE — 80053 COMPREHEN METABOLIC PANEL: CPT | Performed by: NURSE PRACTITIONER

## 2021-09-01 NOTE — PROGRESS NOTES
"Chief Complaint  Annual Exam    Subjective          Ashok Vinson presents to [facility]  History of Present Illness  Here today for annual exam  Has h/o HTN, on amlodipine, hctz, and lisinopril, taking as directed, denies any side effects  Blood pressure is at goal  Denies any c/o chest pain, soa, edema, ha, dizziness, weakness  Also h/o neurofibromatosis    Is a , c/o recent intermittent neck pain, seems worse when he is working bent forward, has been bothering him for the past couple of weeks - no accident or injury  Has taken ibuprofen with relief, provided with neck stretches to try, advised to take occasional breaks through the day and stretch the neck    Does exercise, admits that his diet could be better  Occasional alcohol use  Never smoker    Objective   Vital Signs:   /62 (BP Location: Right arm, Patient Position: Sitting, Cuff Size: Large Adult)   Pulse 90   Resp 16   Ht 195.6 cm (77\")   Wt 120 kg (264 lb)   SpO2 98%   BMI 31.31 kg/m²     Physical Exam  Vitals reviewed.   Constitutional:       Appearance: Normal appearance. He is well-developed and well-groomed.   HENT:      Head: Normocephalic.      Right Ear: Tympanic membrane normal.      Left Ear: Tympanic membrane normal.      Nose: Nose normal.      Mouth/Throat:      Mouth: Mucous membranes are moist.      Pharynx: Oropharynx is clear.   Eyes:      Extraocular Movements: Extraocular movements intact.   Cardiovascular:      Rate and Rhythm: Normal rate and regular rhythm.      Pulses: Normal pulses.      Heart sounds: Normal heart sounds.   Pulmonary:      Effort: Pulmonary effort is normal.      Breath sounds: Normal breath sounds.   Abdominal:      General: Bowel sounds are normal.      Palpations: Abdomen is soft.      Tenderness: There is no abdominal tenderness.   Musculoskeletal:         General: Normal range of motion.      Cervical back: Normal range of motion.      Right lower leg: " No edema.      Left lower leg: No edema.   Skin:     General: Skin is warm.      Capillary Refill: Capillary refill takes less than 2 seconds.   Neurological:      Mental Status: He is alert and oriented to person, place, and time.   Psychiatric:         Mood and Affect: Mood normal.        Result Review :                 Assessment and Plan    Diagnoses and all orders for this visit:    1. Annual physical exam (Primary)  -     Comprehensive metabolic panel; Future  -     CBC w AUTO Differential; Future    2. Neck pain    try stretching exercises, topical pain relievers, massage, return if neck pain persists      Follow Up   Return if neck pain persists, for Annual physical.  Patient was given instructions and counseling regarding his condition or for health maintenance advice. Please see specific information pulled into the AVS if appropriate.

## 2021-09-01 NOTE — PATIENT INSTRUCTIONS
Use good posture  Take breaks as you can while working to stretch your neck    You can also use topical pain relievers, massage and the stretching exercises provided today

## 2021-09-02 DIAGNOSIS — R71.0 HEMOGLOBIN DECREASED: Primary | ICD-10-CM

## 2021-09-02 LAB
ALBUMIN SERPL-MCNC: 4.9 G/DL (ref 3.5–5.2)
ALBUMIN/GLOB SERPL: 1.8 G/DL
ALP SERPL-CCNC: 61 U/L (ref 39–117)
ALT SERPL W P-5'-P-CCNC: 14 U/L (ref 1–41)
ANION GAP SERPL CALCULATED.3IONS-SCNC: 7.4 MMOL/L (ref 5–15)
AST SERPL-CCNC: 14 U/L (ref 1–40)
BASOPHILS # BLD AUTO: 0.07 10*3/MM3 (ref 0–0.2)
BASOPHILS NFR BLD AUTO: 0.8 % (ref 0–1.5)
BILIRUB SERPL-MCNC: 0.3 MG/DL (ref 0–1.2)
BUN SERPL-MCNC: 36 MG/DL (ref 6–20)
BUN/CREAT SERPL: 29.5 (ref 7–25)
CALCIUM SPEC-SCNC: 9.6 MG/DL (ref 8.6–10.5)
CHLORIDE SERPL-SCNC: 99 MMOL/L (ref 98–107)
CO2 SERPL-SCNC: 24.6 MMOL/L (ref 22–29)
CREAT SERPL-MCNC: 1.22 MG/DL (ref 0.76–1.27)
DEPRECATED RDW RBC AUTO: 44.6 FL (ref 37–54)
EOSINOPHIL # BLD AUTO: 0.08 10*3/MM3 (ref 0–0.4)
EOSINOPHIL NFR BLD AUTO: 1 % (ref 0.3–6.2)
ERYTHROCYTE [DISTWIDTH] IN BLOOD BY AUTOMATED COUNT: 13.7 % (ref 12.3–15.4)
GFR SERPL CREATININE-BSD FRML MDRD: 74 ML/MIN/1.73
GLOBULIN UR ELPH-MCNC: 2.7 GM/DL
GLUCOSE SERPL-MCNC: 86 MG/DL (ref 65–99)
HCT VFR BLD AUTO: 36.8 % (ref 37.5–51)
HGB BLD-MCNC: 12.2 G/DL (ref 13–17.7)
IMM GRANULOCYTES # BLD AUTO: 0.02 10*3/MM3 (ref 0–0.05)
IMM GRANULOCYTES NFR BLD AUTO: 0.2 % (ref 0–0.5)
LYMPHOCYTES # BLD AUTO: 2.01 10*3/MM3 (ref 0.7–3.1)
LYMPHOCYTES NFR BLD AUTO: 24 % (ref 19.6–45.3)
MCH RBC QN AUTO: 29.5 PG (ref 26.6–33)
MCHC RBC AUTO-ENTMCNC: 33.2 G/DL (ref 31.5–35.7)
MCV RBC AUTO: 89.1 FL (ref 79–97)
MONOCYTES # BLD AUTO: 0.9 10*3/MM3 (ref 0.1–0.9)
MONOCYTES NFR BLD AUTO: 10.8 % (ref 5–12)
NEUTROPHILS NFR BLD AUTO: 5.29 10*3/MM3 (ref 1.7–7)
NEUTROPHILS NFR BLD AUTO: 63.2 % (ref 42.7–76)
NRBC BLD AUTO-RTO: 0 /100 WBC (ref 0–0.2)
PLATELET # BLD AUTO: 380 10*3/MM3 (ref 140–450)
PMV BLD AUTO: 10.6 FL (ref 6–12)
POTASSIUM SERPL-SCNC: 4.6 MMOL/L (ref 3.5–5.2)
PROT SERPL-MCNC: 7.6 G/DL (ref 6–8.5)
RBC # BLD AUTO: 4.13 10*6/MM3 (ref 4.14–5.8)
SODIUM SERPL-SCNC: 131 MMOL/L (ref 136–145)
WBC # BLD AUTO: 8.37 10*3/MM3 (ref 3.4–10.8)

## 2021-09-09 ENCOUNTER — OFFICE VISIT (OUTPATIENT)
Dept: SURGERY | Facility: CLINIC | Age: 21
End: 2021-09-09

## 2021-09-09 VITALS
HEIGHT: 77 IN | HEART RATE: 83 BPM | WEIGHT: 270 LBS | DIASTOLIC BLOOD PRESSURE: 73 MMHG | TEMPERATURE: 96.9 F | OXYGEN SATURATION: 100 % | SYSTOLIC BLOOD PRESSURE: 162 MMHG | BODY MASS INDEX: 31.88 KG/M2

## 2021-09-09 DIAGNOSIS — Q85.00 NEUROFIBROMA OF MULTIPLE SITES (HCC): Primary | ICD-10-CM

## 2021-09-09 PROCEDURE — 99203 OFFICE O/P NEW LOW 30 MIN: CPT | Performed by: SURGERY

## 2021-09-09 RX ORDER — SODIUM CHLORIDE 9 MG/ML
100 INJECTION, SOLUTION INTRAVENOUS CONTINUOUS
Status: CANCELLED | OUTPATIENT
Start: 2021-09-09

## 2021-09-09 NOTE — H&P (VIEW-ONLY)
"Nadine Vinson is a 21 y.o. male.   Chief Complaint   Patient presents with   • New Patient     Neurofibromas     /73 (BP Location: Left arm, Patient Position: Sitting, Cuff Size: Adult)   Pulse 83   Temp 96.9 °F (36.1 °C) (Infrared)   Ht 195.6 cm (77\")   Wt 122 kg (270 lb)   SpO2 100%   BMI 32.02 kg/m²     HISTORY OF PRESENT ILLNESS:  21-year-old gentleman with neurofibromatosis who comes into the office today with some worsening discomfort around several of his neurofibromas.  He says that he has had several removed in the past.  He has not had any significant complications regarding the removals.  He has some on the left anterior thigh and left groin that seem to be growing and causing discomfort.  He also has one on his right posterior shoulder and his low back that are causing irritation based on their location.  They have been growing and changing.  He denies any known issues with his anesthesia or previous surgeries like wound infections.  He has had a previous hernia repair.      Outpatient Encounter Medications as of 9/9/2021   Medication Sig Dispense Refill   • amLODIPine (Norvasc) 10 MG tablet Take 1 tablet by mouth Daily. 90 tablet 0   • hydroCHLOROthiazide (HYDRODIURIL) 25 MG tablet Take 1 tablet by mouth Daily. 90 tablet 3   • lisinopril (PRINIVIL,ZESTRIL) 40 MG tablet TAKE 1 TABLET BY MOUTH DAILY 90 tablet 3   • azelastine (ASTELIN) 0.1 % nasal spray USE 2 SPRAYS IN EACH NOSTRIL EVERYDAY IN THE EVENING. 30 mL 6     No facility-administered encounter medications on file as of 9/9/2021.         The following portions of the patient's history were reviewed and updated as appropriate: allergies, current medications, past family history, past medical history, past social history, past surgical history and problem list.    Review of Systems    Objective   Physical Exam  Constitutional:       Appearance: Normal appearance.   HENT:      Head:      Comments: Along the left " sternocleidomastoid there is a mass consistent with neurofibroma unsure of the depth  Eyes:      General: No scleral icterus.     Conjunctiva/sclera: Conjunctivae normal.   Cardiovascular:      Pulses: Normal pulses.   Pulmonary:      Effort: Pulmonary effort is normal. No respiratory distress.   Abdominal:      General: There is no distension.      Palpations: Abdomen is soft.   Skin:     General: Skin is warm and dry.      Comments: Multiple soft tissue masses both cutaneous and also subcutaneous.  In the left anterior thigh there are approximately three 2 to 3 cm cutaneous neurofibromas that are causing discomfort in the left groin crease there is a subcutaneous mass that is tender to palpation.  Along the right lower back there are approximately two 2 to 3 cm soft tissue masses and on the right posterior shoulder there is a 1-1/2 cm soft tissue mass.  There is some minor hyperemia of this 1 on his shoulder.   Neurological:      Mental Status: He is alert.           Assessment/Plan   Diagnoses and all orders for this visit:    1. Neurofibroma of multiple sites (CMS/HCC) (Primary)  -     Case Request; Standing  -     sodium chloride 0.9 % infusion  -     ceFAZolin (ANCEF) 3 g in sodium chloride 0.9 % 100 mL IVPB  -     Case Request    Other orders  -     Follow Anesthesia Guidelines / Standing Orders; Future  -     Provide NPO Instructions to Patient; Future  -     Chlorhexidine Skin Prep; Future  -     Follow Anesthesia Guidelines / Standing Orders; Standing  -     Verify NPO Status; Standing  -     Obtain Informed Consent; Standing  -     SCD (Sequential Compression Device) - Place on Patient in Pre-Op; Standing  -     Have Patient Void Prior to Procedure; Standing  -     Instructions on Coughing, Deep Breathing & Incentive Spirometry; Standing  -     Notify Physician - Standard; Standing    Have counseled about the risks and benefits of removal of the soft tissue masses which are most likely neurofibromas.  He  has had these removed before.  Most of them are cutaneous but the one in the left groin is deeper.  We talked about the steps of the operation anticipated, the possible complications.  I can likely take out about 6 or 7 of these in 2 positions in the operating room.  He understands the risk of surgery and wishes to proceed.    Alvin Moran MD  9/9/2021  4:44 PM EDT    This note was created using Dragon Voice Recognition software.

## 2021-09-14 ENCOUNTER — LAB (OUTPATIENT)
Dept: LAB | Facility: HOSPITAL | Age: 21
End: 2021-09-14

## 2021-09-14 LAB
ANION GAP SERPL CALCULATED.3IONS-SCNC: 9.7 MMOL/L (ref 5–15)
BUN SERPL-MCNC: 22 MG/DL (ref 6–20)
BUN/CREAT SERPL: 22.4 (ref 7–25)
CALCIUM SPEC-SCNC: 9.4 MG/DL (ref 8.6–10.5)
CHLORIDE SERPL-SCNC: 104 MMOL/L (ref 98–107)
CO2 SERPL-SCNC: 24.3 MMOL/L (ref 22–29)
CREAT SERPL-MCNC: 0.98 MG/DL (ref 0.76–1.27)
DEPRECATED RDW RBC AUTO: 42.5 FL (ref 37–54)
ERYTHROCYTE [DISTWIDTH] IN BLOOD BY AUTOMATED COUNT: 13.3 % (ref 12.3–15.4)
GFR SERPL CREATININE-BSD FRML MDRD: 95 ML/MIN/1.73
GLUCOSE SERPL-MCNC: 92 MG/DL (ref 65–99)
HCT VFR BLD AUTO: 35.3 % (ref 37.5–51)
HGB BLD-MCNC: 11.7 G/DL (ref 13–17.7)
MCH RBC QN AUTO: 29 PG (ref 26.6–33)
MCHC RBC AUTO-ENTMCNC: 33.1 G/DL (ref 31.5–35.7)
MCV RBC AUTO: 87.6 FL (ref 79–97)
PLATELET # BLD AUTO: 323 10*3/MM3 (ref 140–450)
PMV BLD AUTO: 10.4 FL (ref 6–12)
POTASSIUM SERPL-SCNC: 4.1 MMOL/L (ref 3.5–5.2)
RBC # BLD AUTO: 4.03 10*6/MM3 (ref 4.14–5.8)
SODIUM SERPL-SCNC: 138 MMOL/L (ref 136–145)
WBC # BLD AUTO: 7.37 10*3/MM3 (ref 3.4–10.8)

## 2021-09-14 PROCEDURE — C9803 HOPD COVID-19 SPEC COLLECT: HCPCS

## 2021-09-14 PROCEDURE — U0004 COV-19 TEST NON-CDC HGH THRU: HCPCS

## 2021-09-14 PROCEDURE — 85027 COMPLETE CBC AUTOMATED: CPT

## 2021-09-14 PROCEDURE — 80048 BASIC METABOLIC PNL TOTAL CA: CPT

## 2021-09-15 LAB — SARS-COV-2 ORF1AB RESP QL NAA+PROBE: NOT DETECTED

## 2021-09-16 ENCOUNTER — ANESTHESIA (OUTPATIENT)
Dept: PERIOP | Facility: HOSPITAL | Age: 21
End: 2021-09-16

## 2021-09-16 ENCOUNTER — ANESTHESIA EVENT (OUTPATIENT)
Dept: PERIOP | Facility: HOSPITAL | Age: 21
End: 2021-09-16

## 2021-09-16 ENCOUNTER — HOSPITAL ENCOUNTER (OUTPATIENT)
Facility: HOSPITAL | Age: 21
Setting detail: HOSPITAL OUTPATIENT SURGERY
Discharge: HOME | End: 2021-09-16
Attending: SURGERY | Admitting: SURGERY

## 2021-09-16 VITALS
HEART RATE: 73 BPM | RESPIRATION RATE: 16 BRPM | DIASTOLIC BLOOD PRESSURE: 53 MMHG | OXYGEN SATURATION: 100 % | WEIGHT: 259.6 LBS | HEIGHT: 77 IN | TEMPERATURE: 96.8 F | SYSTOLIC BLOOD PRESSURE: 133 MMHG | BODY MASS INDEX: 30.65 KG/M2

## 2021-09-16 DIAGNOSIS — Q85.00 NEUROFIBROMA OF MULTIPLE SITES (HCC): ICD-10-CM

## 2021-09-16 PROCEDURE — 25010000002 PROPOFOL 10 MG/ML EMULSION

## 2021-09-16 PROCEDURE — 88305 TISSUE EXAM BY PATHOLOGIST: CPT | Performed by: SURGERY

## 2021-09-16 PROCEDURE — 11402 EXC TR-EXT B9+MARG 1.1-2 CM: CPT | Performed by: SURGERY

## 2021-09-16 PROCEDURE — 11403 EXC TR-EXT B9+MARG 2.1-3CM: CPT | Performed by: SURGERY

## 2021-09-16 PROCEDURE — 12036 INTMD RPR S/A/T/EXT 20.1-30: CPT | Performed by: SURGERY

## 2021-09-16 PROCEDURE — 25010000002 MIDAZOLAM PER 1 MG

## 2021-09-16 PROCEDURE — 25010000002 FENTANYL CITRATE (PF) 100 MCG/2ML SOLUTION

## 2021-09-16 PROCEDURE — 25010000002 NEOSTIGMINE 5 MG/5ML SOLUTION

## 2021-09-16 PROCEDURE — 25010000002 ONDANSETRON PER 1 MG

## 2021-09-16 PROCEDURE — 25010000002 DEXAMETHASONE PER 1 MG

## 2021-09-16 PROCEDURE — 25010000003 LIDOCAINE 1 % SOLUTION: Performed by: SURGERY

## 2021-09-16 PROCEDURE — 25010000003 MEPERIDINE PER 100 MG

## 2021-09-16 RX ORDER — PROPOFOL 10 MG/ML
VIAL (ML) INTRAVENOUS AS NEEDED
Status: DISCONTINUED | OUTPATIENT
Start: 2021-09-16 | End: 2021-09-16 | Stop reason: SURG

## 2021-09-16 RX ORDER — HYDROMORPHONE HCL 110MG/55ML
0.25 PATIENT CONTROLLED ANALGESIA SYRINGE INTRAVENOUS
Status: DISCONTINUED | OUTPATIENT
Start: 2021-09-16 | End: 2021-09-16 | Stop reason: HOSPADM

## 2021-09-16 RX ORDER — DEXAMETHASONE SODIUM PHOSPHATE 4 MG/ML
INJECTION, SOLUTION INTRA-ARTICULAR; INTRALESIONAL; INTRAMUSCULAR; INTRAVENOUS; SOFT TISSUE AS NEEDED
Status: DISCONTINUED | OUTPATIENT
Start: 2021-09-16 | End: 2021-09-16 | Stop reason: SURG

## 2021-09-16 RX ORDER — ALBUTEROL SULFATE 2.5 MG/3ML
2.5 SOLUTION RESPIRATORY (INHALATION) ONCE AS NEEDED
Status: DISCONTINUED | OUTPATIENT
Start: 2021-09-16 | End: 2021-09-16 | Stop reason: HOSPADM

## 2021-09-16 RX ORDER — LIDOCAINE HYDROCHLORIDE 10 MG/ML
INJECTION, SOLUTION INFILTRATION; PERINEURAL AS NEEDED
Status: DISCONTINUED | OUTPATIENT
Start: 2021-09-16 | End: 2021-09-16 | Stop reason: HOSPADM

## 2021-09-16 RX ORDER — ONDANSETRON 2 MG/ML
4 INJECTION INTRAMUSCULAR; INTRAVENOUS ONCE AS NEEDED
Status: DISCONTINUED | OUTPATIENT
Start: 2021-09-16 | End: 2021-09-16 | Stop reason: HOSPADM

## 2021-09-16 RX ORDER — SODIUM CHLORIDE 9 MG/ML
100 INJECTION, SOLUTION INTRAVENOUS CONTINUOUS
Status: DISCONTINUED | OUTPATIENT
Start: 2021-09-16 | End: 2021-09-16 | Stop reason: HOSPADM

## 2021-09-16 RX ORDER — CEFAZOLIN SODIUM IN 0.9 % NACL 3 G/100 ML
3 INTRAVENOUS SOLUTION, PIGGYBACK (ML) INTRAVENOUS ONCE
Status: DISCONTINUED | OUTPATIENT
Start: 2021-09-16 | End: 2021-09-16

## 2021-09-16 RX ORDER — FENTANYL CITRATE 50 UG/ML
25 INJECTION, SOLUTION INTRAMUSCULAR; INTRAVENOUS
Status: DISCONTINUED | OUTPATIENT
Start: 2021-09-16 | End: 2021-09-16 | Stop reason: HOSPADM

## 2021-09-16 RX ORDER — HYDROMORPHONE HCL 110MG/55ML
1 PATIENT CONTROLLED ANALGESIA SYRINGE INTRAVENOUS
Status: DISCONTINUED | OUTPATIENT
Start: 2021-09-16 | End: 2021-09-16 | Stop reason: HOSPADM

## 2021-09-16 RX ORDER — HYDROMORPHONE HCL 110MG/55ML
0.5 PATIENT CONTROLLED ANALGESIA SYRINGE INTRAVENOUS
Status: DISCONTINUED | OUTPATIENT
Start: 2021-09-16 | End: 2021-09-16 | Stop reason: HOSPADM

## 2021-09-16 RX ORDER — LABETALOL HYDROCHLORIDE 5 MG/ML
5 INJECTION, SOLUTION INTRAVENOUS
Status: DISCONTINUED | OUTPATIENT
Start: 2021-09-16 | End: 2021-09-16 | Stop reason: HOSPADM

## 2021-09-16 RX ORDER — ROCURONIUM BROMIDE 10 MG/ML
INJECTION, SOLUTION INTRAVENOUS AS NEEDED
Status: DISCONTINUED | OUTPATIENT
Start: 2021-09-16 | End: 2021-09-16 | Stop reason: SURG

## 2021-09-16 RX ORDER — HYDROCODONE BITARTRATE AND ACETAMINOPHEN 5; 325 MG/1; MG/1
1 TABLET ORAL EVERY 4 HOURS PRN
Qty: 10 TABLET | Refills: 0 | Status: SHIPPED | OUTPATIENT
Start: 2021-09-16 | End: 2021-10-07

## 2021-09-16 RX ORDER — ACETAMINOPHEN 325 MG/1
650 TABLET ORAL ONCE AS NEEDED
Status: DISCONTINUED | OUTPATIENT
Start: 2021-09-16 | End: 2021-09-16 | Stop reason: HOSPADM

## 2021-09-16 RX ORDER — MEPERIDINE HYDROCHLORIDE 25 MG/ML
12.5 INJECTION INTRAMUSCULAR; INTRAVENOUS; SUBCUTANEOUS
Status: DISCONTINUED | OUTPATIENT
Start: 2021-09-16 | End: 2021-09-16 | Stop reason: HOSPADM

## 2021-09-16 RX ORDER — NALOXONE HCL 0.4 MG/ML
0.4 VIAL (ML) INJECTION AS NEEDED
Status: DISCONTINUED | OUTPATIENT
Start: 2021-09-16 | End: 2021-09-16 | Stop reason: HOSPADM

## 2021-09-16 RX ORDER — FENTANYL CITRATE 50 UG/ML
50 INJECTION, SOLUTION INTRAMUSCULAR; INTRAVENOUS
Status: DISCONTINUED | OUTPATIENT
Start: 2021-09-16 | End: 2021-09-16 | Stop reason: HOSPADM

## 2021-09-16 RX ORDER — PHENYLEPHRINE HCL IN 0.9% NACL 1 MG/10 ML
SYRINGE (ML) INTRAVENOUS AS NEEDED
Status: DISCONTINUED | OUTPATIENT
Start: 2021-09-16 | End: 2021-09-16 | Stop reason: SURG

## 2021-09-16 RX ORDER — ONDANSETRON 2 MG/ML
INJECTION INTRAMUSCULAR; INTRAVENOUS AS NEEDED
Status: DISCONTINUED | OUTPATIENT
Start: 2021-09-16 | End: 2021-09-16 | Stop reason: SURG

## 2021-09-16 RX ORDER — GLYCOPYRROLATE 1 MG/5 ML
SYRINGE (ML) INTRAVENOUS AS NEEDED
Status: DISCONTINUED | OUTPATIENT
Start: 2021-09-16 | End: 2021-09-16 | Stop reason: SURG

## 2021-09-16 RX ORDER — OXYCODONE HYDROCHLORIDE 5 MG/1
10 TABLET ORAL EVERY 4 HOURS PRN
Status: DISCONTINUED | OUTPATIENT
Start: 2021-09-16 | End: 2021-09-16 | Stop reason: HOSPADM

## 2021-09-16 RX ORDER — FENTANYL CITRATE 50 UG/ML
INJECTION, SOLUTION INTRAMUSCULAR; INTRAVENOUS AS NEEDED
Status: DISCONTINUED | OUTPATIENT
Start: 2021-09-16 | End: 2021-09-16 | Stop reason: SURG

## 2021-09-16 RX ORDER — MIDAZOLAM HYDROCHLORIDE 1 MG/ML
INJECTION INTRAMUSCULAR; INTRAVENOUS AS NEEDED
Status: DISCONTINUED | OUTPATIENT
Start: 2021-09-16 | End: 2021-09-16 | Stop reason: SURG

## 2021-09-16 RX ORDER — SODIUM CHLORIDE, SODIUM LACTATE, POTASSIUM CHLORIDE, CALCIUM CHLORIDE 600; 310; 30; 20 MG/100ML; MG/100ML; MG/100ML; MG/100ML
INJECTION, SOLUTION INTRAVENOUS CONTINUOUS PRN
Status: DISCONTINUED | OUTPATIENT
Start: 2021-09-16 | End: 2021-09-16 | Stop reason: SURG

## 2021-09-16 RX ORDER — ACETAMINOPHEN 650 MG/1
650 SUPPOSITORY RECTAL ONCE AS NEEDED
Status: DISCONTINUED | OUTPATIENT
Start: 2021-09-16 | End: 2021-09-16 | Stop reason: HOSPADM

## 2021-09-16 RX ORDER — NEOSTIGMINE METHYLSULFATE 5 MG/5 ML
SYRINGE (ML) INTRAVENOUS AS NEEDED
Status: DISCONTINUED | OUTPATIENT
Start: 2021-09-16 | End: 2021-09-16 | Stop reason: SURG

## 2021-09-16 RX ORDER — OXYCODONE HYDROCHLORIDE 5 MG/1
7.5 TABLET ORAL ONCE AS NEEDED
Status: DISCONTINUED | OUTPATIENT
Start: 2021-09-16 | End: 2021-09-16 | Stop reason: HOSPADM

## 2021-09-16 RX ORDER — LIDOCAINE HYDROCHLORIDE 20 MG/ML
INJECTION, SOLUTION EPIDURAL; INFILTRATION; INTRACAUDAL; PERINEURAL AS NEEDED
Status: DISCONTINUED | OUTPATIENT
Start: 2021-09-16 | End: 2021-09-16 | Stop reason: SURG

## 2021-09-16 RX ADMIN — PROPOFOL 200 MG: 10 INJECTION, EMULSION INTRAVENOUS at 07:38

## 2021-09-16 RX ADMIN — CEFAZOLIN SODIUM 2 G: 1 INJECTION, POWDER, FOR SOLUTION INTRAMUSCULAR; INTRAVENOUS at 07:45

## 2021-09-16 RX ADMIN — MEPERIDINE HYDROCHLORIDE 12.5 MG: 25 INJECTION INTRAMUSCULAR; INTRAVENOUS; SUBCUTANEOUS at 10:09

## 2021-09-16 RX ADMIN — DEXAMETHASONE SODIUM PHOSPHATE 4 MG: 4 INJECTION, SOLUTION INTRAMUSCULAR; INTRAVENOUS at 07:46

## 2021-09-16 RX ADMIN — LIDOCAINE HYDROCHLORIDE 100 MG: 20 INJECTION, SOLUTION EPIDURAL; INFILTRATION; INTRACAUDAL; PERINEURAL at 07:38

## 2021-09-16 RX ADMIN — SODIUM CHLORIDE, SODIUM LACTATE, POTASSIUM CHLORIDE, AND CALCIUM CHLORIDE: .6; .31; .03; .02 INJECTION, SOLUTION INTRAVENOUS at 08:40

## 2021-09-16 RX ADMIN — ONDANSETRON 4 MG: 2 INJECTION INTRAMUSCULAR; INTRAVENOUS at 08:51

## 2021-09-16 RX ADMIN — Medication 3 MG: at 09:07

## 2021-09-16 RX ADMIN — MIDAZOLAM 2 MG: 1 INJECTION INTRAMUSCULAR; INTRAVENOUS at 07:26

## 2021-09-16 RX ADMIN — SODIUM CHLORIDE, SODIUM LACTATE, POTASSIUM CHLORIDE, AND CALCIUM CHLORIDE: .6; .31; .03; .02 INJECTION, SOLUTION INTRAVENOUS at 07:26

## 2021-09-16 RX ADMIN — Medication 0.4 MG: at 09:07

## 2021-09-16 RX ADMIN — FENTANYL CITRATE 100 MCG: 50 INJECTION, SOLUTION INTRAMUSCULAR; INTRAVENOUS at 07:30

## 2021-09-16 RX ADMIN — ROCURONIUM BROMIDE 50 MG: 10 INJECTION INTRAVENOUS at 07:38

## 2021-09-16 RX ADMIN — Medication 100 MCG: at 08:40

## 2021-09-16 NOTE — ANESTHESIA PROCEDURE NOTES
Airway  Urgency: elective    Date/Time: 9/16/2021 7:41 AM  Airway not difficult    General Information and Staff    Patient location during procedure: OR  Anesthesiologist: Laron Powell MD  CRNA: Kati Funk AA    Indications and Patient Condition  Indications for airway management: airway protection    Preoxygenated: yes  Mask difficulty assessment: 2 - vent by mask + OA or adjuvant +/- NMBA    Final Airway Details  Final airway type: endotracheal airway      Successful airway: ETT  Cuffed: yes   Successful intubation technique: direct laryngoscopy  Facilitating devices/methods: Bougie and anterior pressure/BURP  Endotracheal tube insertion site: oral  Blade: Melani  Blade size: 4  ETT size (mm): 7.0  Cormack-Lehane Classification: grade III - view of epiglottis only  Placement verified by: capnometry   Measured from: teeth  ETT/EBT  to teeth (cm): 23  Number of attempts at approach: 1  Assessment: lips, teeth, and gum same as pre-op and atraumatic intubation    Additional Comments  Intubation performed by KADIE Cummins.

## 2021-09-16 NOTE — OP NOTE
Operative Report:    Patient Name:  Ashok Vinson  YOB: 2000    Date of Surgery:  9/16/2021     Indications: 21-year-old gentleman who came to the office with a history of neurofibromatosis who has multiple skin and subcutaneous lesions many of which have been growing and causing him discomfort he would like to have removed.  I counseled him about removal of these lesions off his left thigh left groin right shoulder and lower back he understood the risks and wished to proceed.    Pre-op Diagnosis:   Neurofibroma of multiple sites (CMS/HCC) [Q85.00]       Post-Op Diagnosis Codes:     * Neurofibroma of multiple sites (CMS/HCC) [Q85.00]    Procedure/CPT® Codes:      Procedure(s):  Excision of left thigh soft tissue mass 3 cm in size with a 5 cm intermediate closure  Excision of left thigh soft tissue mass 2.8 cm with a 5 cm intermediate closure  Excision of left thigh soft tissue mass 1.8 cm with a 4 cm intermediate closure  Excision of left groin subcutaneous soft tissue mass 2.6 cm with simple closure  Excision of right shoulder soft tissue mass 2.1 cm with 4 cm intermediate closure  Excision of right lower back soft tissue mass 1.7 cm with 4 cm intermediate closure       Staff:  Surgeon(s):  Alvin Moran MD    Circulator: Christine Saldana RN  Scrub Person: German Meyer          Anesthesia: General    Estimated Blood Loss: minimal    Implants:    Nothing was implanted during the procedure    Specimen:          Specimens     ID Source Type Tests Collected By Collected At Frozen?    A Groin, left Tissue · TISSUE PATHOLOGY EXAM   Alvin Moran MD 9/16/21 0107     Description: LEFT MEDIAL GROIN    Comment: SUBCUTANEOUS MASS 4    B Thigh, Left Tissue · TISSUE PATHOLOGY EXAM   Alvin Moran MD 9/16/21 2881     Comment: SUBCUTANEOUS MASS 1,2 AND 3    C Shoulder, Right Tissue · TISSUE PATHOLOGY EXAM   Alvin Moran MD 9/16/21 3070     Description: RIGHT POSTERIOR SHOULDER    Comment:  SUBCUTANEOUS MASS 5    D Back, Lower Tissue · TISSUE PATHOLOGY EXAM   Alvin Moran MD 9/16/21 0852     Description: RIGHT LOWER BACK    Comment: SUBCUTANEOUS MASS 5              Findings: Multiple subcutaneous nodules    Complications: None    Description of Procedure: Patient was taken to the operating room placed on the operating table in the supine position under general anesthesia.  His left groin and thigh were prepped draped normal sterile fashion.  Timeout was performed identifying the correct patient procedure and site of operation.  I began the operation by starting with a left lower thigh lesion.  The borders of the excision site were marked with a marking pen the skin is subtenons tissue were infiltrated with local anesthetic.  A elliptical incision was made with a 15 blade scalpel around the mass.  Dissection through subcutaneous tissue was performed with the Bovie.  Once the mass was completely free from the surrounding tissues it was measured labeled and sent to pathology for review.  I then raised a 2 cm subcutaneous skin flap to help facilitate tension-free closure the skin was closed with interrupted 3-0 Vicryl sutures and running 4-0 Vicryl sutures.  The second lesion was just superior to this and the exact steps outlined above were performed.  The dimensions are listed in the procedure performed.  The third thigh lesion was a little bit more lateral and a bit smaller.  Mastisol and Steri-Strips were placed on the thigh incisions and were covered with a covered arm dressing.     Once these were all closed I turned my attention to the left groin.  The skin subtenons tissue was infiltrated with local anesthetic and an oblique skin incision was made with a 15 blade scalpel along the groin crease.  Dissection through subcutaneous tissue down to the soft tissue mass was performed with the Bovie the subcutaneous mass was circumferentially dissected free from surrounding tissues once it was completely  free it was measured and sent to pathology for review.  Since this did not require an elliptical excision there was no tension so it was closed with interrupted 3-0 Vicryl sutures and skin affix.  The sterile field was then broken down he was transitioned to the left lateral decubitus position.  His right shoulder and lower back were prepped in normal sterile fashion.     I then infiltrated the skin subtenons tissue of the right shoulder with local anesthetic.  The excision borders were marked with a marking pen.  A 15 blade scalpel was used to excise an elliptical area of skin with the underlying subcutaneous nodule.  The Bovie was used to dissect the nodule away from the subcutaneous tissues.  Once it was completely free it was labeled sent to pathology for review.  Approximately 2 cm subcutaneous skin flap was created circumferentially to allow for tension-free closure.  The skin was closed with interrupted 3-0 Vicryl suture running 4-0 Vicryl suture.  I then to my attention to the right lower back where similarly the steps above were performed.  It also required an intermediate closure with small subcutaneous skin flaps interrupted 3 oh and running 4-0 Vicryl suture.  The measurements are outlined above.  The site on the shoulder and the lower back were dressed with Mastisol Steri-Strips and covered arms.      Alvin Moran MD     Date: 9/16/2021  Time: 09:12 EDT    This note was created using Dragon Voice Recognition software.

## 2021-09-16 NOTE — ANESTHESIA POSTPROCEDURE EVALUATION
Patient: Ashok Vinson    Procedure Summary     Date: 09/16/21 Room / Location: [facility] OR 06 / [facility] MAIN OR    Anesthesia Start: 0726 Anesthesia Stop: 0927    Procedure: SUBCUTANEOUS MASS EXCISION MULTIPLE SITES,BACK,THIGH, SHOULDER (N/A ) Diagnosis:       Neurofibroma of multiple sites (CMS/HCC)      (Neurofibroma of multiple sites (CMS/HCC) [Q85.00])    Surgeons: Alvin Moran MD Provider: Laron Powell MD    Anesthesia Type: general ASA Status: 2          Anesthesia Type: general    Vitals  Vitals Value Taken Time   /55 09/16/21 0935   Temp 97.5 °F (36.4 °C) 09/16/21 0923   Pulse 71 09/16/21 0937   Resp 11 09/16/21 0933   SpO2 99 % 09/16/21 0937   Vitals shown include unvalidated device data.        Post Anesthesia Care and Evaluation    Patient location during evaluation: PACU  Patient participation: complete - patient participated  Level of consciousness: awake  Pain scale: See nurse's notes for pain score.  Pain management: adequate  Airway patency: patent  Anesthetic complications: No anesthetic complications  PONV Status: none  Cardiovascular status: acceptable  Respiratory status: acceptable  Hydration status: acceptable    Comments: Patient seen and examined postoperatively; vital signs stable; SpO2 greater than or equal to 90%; cardiopulmonary status stable; nausea/vomiting adequately controlled; pain adequately controlled; no apparent anesthesia complications; patient discharged from anesthesia care when discharge criteria were met

## 2021-09-16 NOTE — ANESTHESIA PREPROCEDURE EVALUATION
Anesthesia Evaluation     Patient summary reviewed and Nursing notes reviewed   NPO Solid Status: > 8 hours  NPO Liquid Status: > 8 hours           Airway   Mallampati: II  TM distance: >3 FB  Neck ROM: full  No difficulty expected  Dental      Pulmonary    Cardiovascular   Exercise tolerance: good (4-7 METS)    (+) hypertension,       Neuro/Psych  GI/Hepatic/Renal/Endo      Musculoskeletal     Abdominal    Substance History      OB/GYN          Other                        Anesthesia Plan    ASA 2     general     intravenous induction     Anesthetic plan, all risks, benefits, and alternatives have been provided, discussed and informed consent has been obtained with: patient.

## 2021-09-17 LAB
LAB AP CASE REPORT: NORMAL
LAB AP CLINICAL INFORMATION: NORMAL
PATH REPORT.FINAL DX SPEC: NORMAL
PATH REPORT.GROSS SPEC: NORMAL

## 2021-09-23 ENCOUNTER — OFFICE VISIT (OUTPATIENT)
Dept: SURGERY | Facility: CLINIC | Age: 21
End: 2021-09-23

## 2021-09-23 VITALS
TEMPERATURE: 98.7 F | WEIGHT: 267.8 LBS | BODY MASS INDEX: 31.62 KG/M2 | HEIGHT: 77 IN | DIASTOLIC BLOOD PRESSURE: 62 MMHG | OXYGEN SATURATION: 100 % | HEART RATE: 84 BPM | RESPIRATION RATE: 18 BRPM | SYSTOLIC BLOOD PRESSURE: 114 MMHG

## 2021-09-23 DIAGNOSIS — Q85.00 NEUROFIBROMA OF MULTIPLE SITES (HCC): Primary | ICD-10-CM

## 2021-09-23 PROCEDURE — 99024 POSTOP FOLLOW-UP VISIT: CPT | Performed by: SURGERY

## 2021-09-23 RX ORDER — DIAPER,BRIEF,INFANT-TODD,DISP
EACH MISCELLANEOUS
Qty: 30 G | Refills: 1 | Status: SHIPPED | OUTPATIENT
Start: 2021-09-23 | End: 2021-09-30

## 2021-09-23 RX ORDER — SULFAMETHOXAZOLE AND TRIMETHOPRIM 400; 80 MG/1; MG/1
1 TABLET ORAL 2 TIMES DAILY
Qty: 14 TABLET | Refills: 0 | Status: SHIPPED | OUTPATIENT
Start: 2021-09-23 | End: 2021-10-07

## 2021-09-23 NOTE — PROGRESS NOTES
"Subjective   Ashok Vinson is a 21 y.o. male.   Status post excision of multiple neurofibromas from the left thigh the left groin and the right shoulder and the lower back.  He has done reasonably well but has developed some redness of the skin surrounding the each of these excision sites.  On the shoulder and the lower back he has a area of raised red skin with some mild blanching without significant surrounding induration.  The incision itself appears okay without any evidence of abscess.  Similar the low back has this appearance.  I believe that this is a dermatitis related to the adhesives.  On his left thigh where there are 3 sites that are very close together the most adhesive was there he also noticed some weeping in this area so has been placing adhesive bandages and there is significant erythema that connects all 3 of these incisions there is some minor weeping from the incisions.  The skin is thick and tender to palpation suggesting some cellulitis.  Interestingly the left inner thigh excision site looks great without any erythema at all in this location skin glue was used as opposed to Steri-Strips and adhesive border gauze.  This was the area I was most concerned about infection given his location in the groin.    Objective   /62 (BP Location: Right arm, Patient Position: Sitting, Cuff Size: Large Adult)   Pulse 84   Temp 98.7 °F (37.1 °C) (Infrared)   Resp 18   Ht 195.6 cm (77\")   Wt 121 kg (267 lb 12.8 oz)   SpO2 100%   BMI 31.76 kg/m²     Assessment/Plan   Diagnoses and all orders for this visit:    1. Neurofibroma of multiple sites (HCC) (Primary)    Other orders  -     sulfamethoxazole-trimethoprim (Bactrim) 400-80 MG tablet; Take 1 tablet by mouth 2 (Two) Times a Day.  Dispense: 14 tablet; Refill: 0  -     hydrocortisone 1 % cream; Apply to affected area 2 times daily  Dispense: 30 g; Refill: 1    About a week out from excision of multiple neurofibromas.  I believe he has an " adhesive sensitivity because at every site where there was adhesive he has red warm raised skin in the sites that did not have adhesive looked normal.  He also applied some Neosporin to the left thigh which could be contributing to this.  I do believe that he has developed some cellulitis related to the ongoing inflammation on the left thigh but there is no cellulitis of the shoulder and the lower back.  For this reason I have prescribed him some oral antibiotics.  I have also prescribed him some topical steroid for the sites of his shoulder and his back.  I want him to avoid placing any adhesive bandages on his left thigh and to use bandages that can be secured with his boxer briefs to absorb moisture but not have any adhesive on the skin itself.  We will see him again in about 1 week for evaluation of his wounds.    Alvin Moran MD  9/23/2021  4:11 PM EDT    This note was created using Dragon Voice Recognition software.

## 2021-09-24 ENCOUNTER — LAB (OUTPATIENT)
Dept: FAMILY MEDICINE CLINIC | Facility: CLINIC | Age: 21
End: 2021-09-24

## 2021-09-24 ENCOUNTER — OFFICE VISIT (OUTPATIENT)
Dept: FAMILY MEDICINE CLINIC | Facility: CLINIC | Age: 21
End: 2021-09-24

## 2021-09-24 VITALS
TEMPERATURE: 97.8 F | BODY MASS INDEX: 31.17 KG/M2 | HEIGHT: 77 IN | HEART RATE: 119 BPM | WEIGHT: 264 LBS | SYSTOLIC BLOOD PRESSURE: 138 MMHG | DIASTOLIC BLOOD PRESSURE: 75 MMHG | OXYGEN SATURATION: 98 %

## 2021-09-24 DIAGNOSIS — M54.2 NECK PAIN: ICD-10-CM

## 2021-09-24 DIAGNOSIS — D64.9 ANEMIA, UNSPECIFIED TYPE: ICD-10-CM

## 2021-09-24 DIAGNOSIS — D64.9 ANEMIA, UNSPECIFIED TYPE: Primary | ICD-10-CM

## 2021-09-24 PROCEDURE — 99214 OFFICE O/P EST MOD 30 MIN: CPT | Performed by: NURSE PRACTITIONER

## 2021-09-24 PROCEDURE — 83540 ASSAY OF IRON: CPT | Performed by: NURSE PRACTITIONER

## 2021-09-24 PROCEDURE — 85014 HEMATOCRIT: CPT | Performed by: NURSE PRACTITIONER

## 2021-09-24 PROCEDURE — 82607 VITAMIN B-12: CPT | Performed by: NURSE PRACTITIONER

## 2021-09-24 PROCEDURE — 36415 COLL VENOUS BLD VENIPUNCTURE: CPT

## 2021-09-24 PROCEDURE — 84466 ASSAY OF TRANSFERRIN: CPT | Performed by: NURSE PRACTITIONER

## 2021-09-24 PROCEDURE — 85018 HEMOGLOBIN: CPT | Performed by: NURSE PRACTITIONER

## 2021-09-24 NOTE — PROGRESS NOTES
"Chief Complaint  lab result follow up    Subjective          Ashok Vinson presents to [facility]  History of Present Illness  Here today for follow up neck pain, pain is a little bit improved  He has been using stretches through the day, has used icy hot or some similar   Discussed next steps, can consider xray, referral to ortho/spine  Suggested that he consider massage, maybe PT    Reviewed recent labs, hgb/hct was initially low and was lower on repeat lab 2 weeks later  Denies any gi symptoms - no reflux, heart burn, abdominal pain, dark/tarry stools, or other problems  Denies any c/o fatigue or weakness - does admit that he is tired at the end of the day, only sleeps 6-7 hours/night    Objective   Vital Signs:   /75 (BP Location: Left arm, Patient Position: Sitting, Cuff Size: Adult)   Pulse 119   Temp 97.8 °F (36.6 °C) (Skin)   Ht 195.6 cm (77\")   Wt 120 kg (264 lb)   SpO2 98%   BMI 31.31 kg/m²     Physical Exam  Vitals reviewed.   Constitutional:       Appearance: Normal appearance.   Cardiovascular:      Rate and Rhythm: Normal rate and regular rhythm.      Heart sounds: Normal heart sounds.   Pulmonary:      Effort: Pulmonary effort is normal.      Breath sounds: Normal breath sounds.   Abdominal:      General: Bowel sounds are normal. There is no distension.      Palpations: Abdomen is soft.      Tenderness: There is no abdominal tenderness. There is no guarding or rebound.   Musculoskeletal:         General: Normal range of motion.      Cervical back: Normal range of motion and neck supple.   Neurological:      Mental Status: He is alert.        Result Review :     CMP    CMP 9/1/21 9/14/21   Glucose 86 92   BUN 36 (A) 22 (A)   Creatinine 1.22 0.98   eGFR Non African Am 74 95   Sodium 131 (A) 138   Potassium 4.6 4.1   Chloride 99 104   Calcium 9.6 9.4   Albumin 4.90    Total Bilirubin 0.3    Alkaline Phosphatase 61    AST (SGOT) 14    ALT (SGPT) 14    (A) " Abnormal value            CBC    CBC 9/1/21 9/14/21   WBC 8.37 7.37   RBC 4.13 (A) 4.03 (A)   Hemoglobin 12.2 (A) 11.7 (A)   Hematocrit 36.8 (A) 35.3 (A)   MCV 89.1 87.6   MCH 29.5 29.0   MCHC 33.2 33.1   RDW 13.7 13.3   Platelets 380 323   (A) Abnormal value                TSH    TSH 12/21/20   TSH 1.260                             Assessment and Plan    Diagnoses and all orders for this visit:    1. Anemia, unspecified type (Primary)  -     POCT occult blood x 1 stool  -     Hemoglobin and hematocrit, blood; Future  -     Iron and TIBC; Future  -     Vitamin B12; Future    2. Neck pain    anemia: continue eval of anemia, follow up as needed.  Neck pain: continue current therapies, call/return if wants to proceed with PT, xray, referral to ortho    Follow Up   Return if symptoms worsen or fail to improve.  Patient was given instructions and counseling regarding his condition or for health maintenance advice. Please see specific information pulled into the AVS if appropriate.

## 2021-09-25 LAB
HCT VFR BLD AUTO: 40 % (ref 37.5–51)
HGB BLD-MCNC: 12.8 G/DL (ref 13–17.7)
IRON 24H UR-MRATE: 67 MCG/DL (ref 59–158)
IRON SATN MFR SERPL: 18 % (ref 20–50)
TIBC SERPL-MCNC: 375 MCG/DL (ref 298–536)
TRANSFERRIN SERPL-MCNC: 252 MG/DL (ref 200–360)
VIT B12 BLD-MCNC: 575 PG/ML (ref 211–946)

## 2021-09-28 ENCOUNTER — LAB (OUTPATIENT)
Dept: LAB | Facility: HOSPITAL | Age: 21
End: 2021-09-28

## 2021-09-28 DIAGNOSIS — D64.9 ANEMIA, UNSPECIFIED TYPE: ICD-10-CM

## 2021-09-28 LAB — HEMOCCULT STL QL IA: NEGATIVE

## 2021-09-28 PROCEDURE — 82274 ASSAY TEST FOR BLOOD FECAL: CPT

## 2021-09-29 DIAGNOSIS — D64.9 ANEMIA, UNSPECIFIED TYPE: Primary | ICD-10-CM

## 2021-09-30 ENCOUNTER — OFFICE VISIT (OUTPATIENT)
Dept: SURGERY | Facility: CLINIC | Age: 21
End: 2021-09-30

## 2021-09-30 VITALS
TEMPERATURE: 98.7 F | WEIGHT: 266.4 LBS | BODY MASS INDEX: 31.46 KG/M2 | DIASTOLIC BLOOD PRESSURE: 64 MMHG | HEIGHT: 77 IN | HEART RATE: 82 BPM | SYSTOLIC BLOOD PRESSURE: 150 MMHG | OXYGEN SATURATION: 98 %

## 2021-09-30 DIAGNOSIS — Q85.00 NF (NEUROFIBROMATOSIS) (HCC): Primary | ICD-10-CM

## 2021-09-30 PROCEDURE — 99024 POSTOP FOLLOW-UP VISIT: CPT | Performed by: STUDENT IN AN ORGANIZED HEALTH CARE EDUCATION/TRAINING PROGRAM

## 2021-10-07 ENCOUNTER — OFFICE VISIT (OUTPATIENT)
Dept: SURGERY | Facility: CLINIC | Age: 21
End: 2021-10-07

## 2021-10-07 VITALS
RESPIRATION RATE: 18 BRPM | HEIGHT: 77 IN | WEIGHT: 265 LBS | HEART RATE: 80 BPM | TEMPERATURE: 96.9 F | OXYGEN SATURATION: 98 % | DIASTOLIC BLOOD PRESSURE: 71 MMHG | BODY MASS INDEX: 31.29 KG/M2 | SYSTOLIC BLOOD PRESSURE: 128 MMHG

## 2021-10-07 DIAGNOSIS — Q85.00 NEUROFIBROMA OF MULTIPLE SITES (HCC): Primary | ICD-10-CM

## 2021-10-07 PROCEDURE — 99024 POSTOP FOLLOW-UP VISIT: CPT | Performed by: SURGERY

## 2021-10-07 NOTE — PROGRESS NOTES
"Subjective   Asohk Vinson is a 21 y.o. male.   21-year-old gentleman who is here for second postop visit after removal of multiple neurofibromas from the left thigh left groin right shoulder and lower back.  When I last saw him he had a significant skin reaction either due to the suture material or the adhesives in his left thigh had developed a cellulitis.  He was on oral antibiotics and did use some topical hydrocortisone and the majority of that thickened indurated weepy skin has resolved.  He says that his right shoulder incision which previously was looking really good opened up exposing some suture material but it is gradually healing in.  He has been able to work.    Objective   /71 (BP Location: Right arm, Patient Position: Sitting, Cuff Size: Large Adult)   Pulse 80   Temp 96.9 °F (36.1 °C) (Infrared)   Resp 18   Ht 195.6 cm (77\")   Wt 120 kg (265 lb)   SpO2 98%   BMI 31.42 kg/m²   Physical Exam   On exam the left thigh incisions have scabbing over the central aspect and some mild surrounding hyperemia at the superficial skin but the induration and circumferential erythema is significantly improved compared to last time.  On the right posterior shoulder the wound has opened up some there is approximately 1 mm deep wound with a granulation base.  The right lower back incision is healing well and the left inner thigh incision has sealed over without any erythema at all.      Assessment/Plan   Diagnoses and all orders for this visit:    1. Neurofibroma of multiple sites (HCC) (Primary)    Status post removal of multiple neurofibromas.  Recovering well.  No need for any additional antibiotics.  Wash and dry with soap and water cover as needed but try to avoid adhesives as I think the adhesives contributed to the left thigh issues.    Alvin Moran MD  10/7/2021  4:07 PM EDT    This note was created using Dragon Voice Recognition software.  "

## 2021-11-01 RX ORDER — AMLODIPINE BESYLATE 10 MG/1
10 TABLET ORAL DAILY
Qty: 90 TABLET | Refills: 0 | Status: SHIPPED | OUTPATIENT
Start: 2021-11-01 | End: 2022-02-06 | Stop reason: SDUPTHER

## 2021-12-09 PROCEDURE — U0004 COV-19 TEST NON-CDC HGH THRU: HCPCS

## 2022-02-07 RX ORDER — AMLODIPINE BESYLATE 10 MG/1
10 TABLET ORAL DAILY
Qty: 90 TABLET | Refills: 0 | Status: SHIPPED | OUTPATIENT
Start: 2022-02-07 | End: 2022-05-11 | Stop reason: SDUPTHER

## 2022-03-31 RX ORDER — LISINOPRIL 40 MG/1
40 TABLET ORAL DAILY
Qty: 90 TABLET | Refills: 3 | Status: SHIPPED | OUTPATIENT
Start: 2022-03-31

## 2022-05-12 RX ORDER — AMLODIPINE BESYLATE 10 MG/1
10 TABLET ORAL DAILY
Qty: 90 TABLET | Refills: 0 | Status: SHIPPED | OUTPATIENT
Start: 2022-05-12 | End: 2022-08-11 | Stop reason: SDUPTHER

## 2022-08-11 RX ORDER — AMLODIPINE BESYLATE 10 MG/1
10 TABLET ORAL DAILY
Qty: 90 TABLET | Refills: 0 | Status: CANCELLED | OUTPATIENT
Start: 2022-08-11

## 2022-08-12 RX ORDER — AMLODIPINE BESYLATE 10 MG/1
10 TABLET ORAL DAILY
Qty: 90 TABLET | Refills: 0 | Status: SHIPPED | OUTPATIENT
Start: 2022-08-12 | End: 2022-11-17 | Stop reason: SDUPTHER

## 2022-11-18 RX ORDER — AMLODIPINE BESYLATE 10 MG/1
10 TABLET ORAL DAILY
Qty: 90 TABLET | Refills: 0 | Status: SHIPPED | OUTPATIENT
Start: 2022-11-18 | End: 2023-01-11 | Stop reason: SDUPTHER

## 2022-11-18 NOTE — TELEPHONE ENCOUNTER
HUB TO SHARE: SENT FOLLOWING [employer] MESSAGE. We refilled your amlodipine but will need to be seen before your next refill. Please call the office at [phone] to schedule an appointment with Dr Kovacs.

## 2022-12-06 ENCOUNTER — OFFICE VISIT (OUTPATIENT)
Dept: FAMILY MEDICINE CLINIC | Facility: CLINIC | Age: 22
End: 2022-12-06

## 2022-12-06 ENCOUNTER — LAB (OUTPATIENT)
Dept: FAMILY MEDICINE CLINIC | Facility: CLINIC | Age: 22
End: 2022-12-06

## 2022-12-06 VITALS
RESPIRATION RATE: 16 BRPM | OXYGEN SATURATION: 98 % | HEART RATE: 69 BPM | HEIGHT: 77 IN | WEIGHT: 255.6 LBS | SYSTOLIC BLOOD PRESSURE: 132 MMHG | DIASTOLIC BLOOD PRESSURE: 60 MMHG | BODY MASS INDEX: 30.18 KG/M2

## 2022-12-06 DIAGNOSIS — R53.83 FATIGUE, UNSPECIFIED TYPE: Primary | ICD-10-CM

## 2022-12-06 LAB — HBA1C MFR BLD: 5.2 % (ref 3.5–5.6)

## 2022-12-06 PROCEDURE — 99213 OFFICE O/P EST LOW 20 MIN: CPT | Performed by: STUDENT IN AN ORGANIZED HEALTH CARE EDUCATION/TRAINING PROGRAM

## 2022-12-06 PROCEDURE — 80050 GENERAL HEALTH PANEL: CPT | Performed by: STUDENT IN AN ORGANIZED HEALTH CARE EDUCATION/TRAINING PROGRAM

## 2022-12-06 PROCEDURE — 83036 HEMOGLOBIN GLYCOSYLATED A1C: CPT | Performed by: STUDENT IN AN ORGANIZED HEALTH CARE EDUCATION/TRAINING PROGRAM

## 2022-12-06 PROCEDURE — 36415 COLL VENOUS BLD VENIPUNCTURE: CPT | Performed by: STUDENT IN AN ORGANIZED HEALTH CARE EDUCATION/TRAINING PROGRAM

## 2022-12-06 NOTE — PROGRESS NOTES
"Chief Complaint  Chief Complaint   Patient presents with   • Fatigue     Subjective        Ashok Vinson is a 22 y.o. male who presents to [facility].    History of Present Illness  Fatigued x 2 wks.    He takes lisinopril and amlodipine for HTN.  He did not have a preceding illness before the fatigue started.    No associated symptoms such as shortness of breath, cough, runny nose, vomiting, diarrhea, fevers.    He sleeps okay at night.    No recent changes to his medications.  Eating and drinking well.    No frequent thirst or frequent urination.    No FH of thyroid issues that he is aware of.    No cold or hot intolerance.      Objective   /60   Pulse 69   Resp 16   Ht 195.6 cm (77\")   Wt 116 kg (255 lb 9.6 oz)   SpO2 98%   BMI 30.31 kg/m²     Estimated body mass index is 30.31 kg/m² as calculated from the following:    Height as of this encounter: 195.6 cm (77\").    Weight as of this encounter: 116 kg (255 lb 9.6 oz).     Physical Exam   GEN: In no acute distress, non toxic appearing  HEENT: Pupils equal and reactive to light, sclera clear. Mucous membranes moist. Oropharynx without erythema or exudate. TMs WNL.    CV: Regular rate and rhythm, no murmurs, 2+ peripheral pulses  RESP: Lungs clear to auscultation anteriorly and posteriorly in all lung fields bilaterally.  MSK: Normal gait  NEURO: AAO to person, place, and time. CN 2-12 intact grossly. Strength 5/5 in all 4 extremities. Sensation to light touch intact in all 4 extremities.   PSYCH: Affect normal, insight fair     Result Review :              Assessment and Plan     Diagnoses and all orders for this visit:    1. Fatigue, unspecified type (Primary)  Differential broad.  Will check CBC, he does have history of anemia.  Will check metabolic panel to assess e'lytes, kidney function and liver function.  Check thyroid function and A1C to eval for DM.  Discussed appropriate hydration and nutrition with " emphasis on fruits and vegetables.  Discussed adequate sleep.  Recommended exercise especially on days he is not working.    -     CBC (No Diff)  -     Comprehensive Metabolic Panel  -     TSH  -     Hemoglobin A1c

## 2022-12-07 LAB
ALBUMIN SERPL-MCNC: 4.6 G/DL (ref 3.5–5.2)
ALBUMIN/GLOB SERPL: 1.8 G/DL
ALP SERPL-CCNC: 67 U/L (ref 39–117)
ALT SERPL W P-5'-P-CCNC: 14 U/L (ref 1–41)
ANION GAP SERPL CALCULATED.3IONS-SCNC: 9 MMOL/L (ref 5–15)
AST SERPL-CCNC: 16 U/L (ref 1–40)
BILIRUB SERPL-MCNC: 0.3 MG/DL (ref 0–1.2)
BUN SERPL-MCNC: 16 MG/DL (ref 6–20)
BUN/CREAT SERPL: 14.4 (ref 7–25)
CALCIUM SPEC-SCNC: 9.7 MG/DL (ref 8.6–10.5)
CHLORIDE SERPL-SCNC: 101 MMOL/L (ref 98–107)
CO2 SERPL-SCNC: 28 MMOL/L (ref 22–29)
CREAT SERPL-MCNC: 1.11 MG/DL (ref 0.76–1.27)
DEPRECATED RDW RBC AUTO: 43.1 FL (ref 37–54)
EGFRCR SERPLBLD CKD-EPI 2021: 95.1 ML/MIN/1.73
ERYTHROCYTE [DISTWIDTH] IN BLOOD BY AUTOMATED COUNT: 13.5 % (ref 12.3–15.4)
GLOBULIN UR ELPH-MCNC: 2.6 GM/DL
GLUCOSE SERPL-MCNC: 83 MG/DL (ref 65–99)
HCT VFR BLD AUTO: 41.1 % (ref 37.5–51)
HGB BLD-MCNC: 13.6 G/DL (ref 13–17.7)
MCH RBC QN AUTO: 29.3 PG (ref 26.6–33)
MCHC RBC AUTO-ENTMCNC: 33.1 G/DL (ref 31.5–35.7)
MCV RBC AUTO: 88.6 FL (ref 79–97)
PLATELET # BLD AUTO: 299 10*3/MM3 (ref 140–450)
PMV BLD AUTO: 10.5 FL (ref 6–12)
POTASSIUM SERPL-SCNC: 5 MMOL/L (ref 3.5–5.2)
PROT SERPL-MCNC: 7.2 G/DL (ref 6–8.5)
RBC # BLD AUTO: 4.64 10*6/MM3 (ref 4.14–5.8)
SODIUM SERPL-SCNC: 138 MMOL/L (ref 136–145)
TSH SERPL DL<=0.05 MIU/L-ACNC: 0.93 UIU/ML (ref 0.27–4.2)
WBC NRBC COR # BLD: 5.51 10*3/MM3 (ref 3.4–10.8)

## 2023-01-06 ENCOUNTER — OFFICE VISIT (OUTPATIENT)
Dept: FAMILY MEDICINE CLINIC | Facility: CLINIC | Age: 23
End: 2023-01-06
Payer: COMMERCIAL

## 2023-01-06 VITALS
OXYGEN SATURATION: 99 % | RESPIRATION RATE: 18 BRPM | BODY MASS INDEX: 29.43 KG/M2 | WEIGHT: 249.2 LBS | HEIGHT: 77 IN | HEART RATE: 82 BPM | SYSTOLIC BLOOD PRESSURE: 128 MMHG | DIASTOLIC BLOOD PRESSURE: 58 MMHG

## 2023-01-06 DIAGNOSIS — I10 PRIMARY HYPERTENSION: ICD-10-CM

## 2023-01-06 DIAGNOSIS — Z00.00 ANNUAL PHYSICAL EXAM: Primary | ICD-10-CM

## 2023-01-06 PROCEDURE — 99395 PREV VISIT EST AGE 18-39: CPT | Performed by: STUDENT IN AN ORGANIZED HEALTH CARE EDUCATION/TRAINING PROGRAM

## 2023-01-06 NOTE — PROGRESS NOTES
Chief Complaint  Chief Complaint   Patient presents with   • Annual Exam     Had labs 12/7/22     Subjective        Ashok Vinson is a 22 y.o. male who presents to [facility].    History of Present Illness  Here for annual physical.    Diet: could be better about fruits and vegetables.  Drinks mostly water.  Coffee on the weekends.  Drinks 1 can of soda every other day.    Exercise: has a gym membership but not regular about going.    Sleep: no concerns.  His fatigue is improved.    Family history: no colon ca or prostate ca that he is aware of.    Never smoker.      On amlodipine and lisinopril for his HTN.  At goal today.      He had blood work done in December.  We reviewed it today and it was all normal.      Objective   /58   Pulse 82   Resp 18   Ht 195.6 cm (77\")   Wt 113 kg (249 lb 3.2 oz)   SpO2 99%   BMI 29.55 kg/m²     Estimated body mass index is 29.55 kg/m² as calculated from the following:    Height as of this encounter: 195.6 cm (77\").    Weight as of this encounter: 113 kg (249 lb 3.2 oz).     Physical Exam   GEN: In no acute distress, overall well appearing  HEENT: Pupils equal and reactive to light, sclera clear. Mucous membranes moist. Oropharynx without erythema or exudate, 2+ tonsils. TMs WNL.    CV: Regular rate and rhythm, no murmurs, 2+ peripheral pulses, No extremity edema.   RESP: Lungs clear to auscultation anteriorly and posteriorly in all lung fields bilaterally.  MSK: Normal gait  NEURO: AAO to person, place, and time. CN 2-12 intact grossly  PSYCH: Affect normal, insight fair     PHQ-2 Depression Screening  Little interest or pleasure in doing things? 0-->not at all   Feeling down, depressed, or hopeless? 0-->not at all   PHQ-2 Total Score 0      Result Review :    The following data was reviewed by: Ric Morris DO on 01/06/2023:  CMP    CMP 12/6/22   Glucose 83   BUN 16   Creatinine 1.11   eGFR 95.1   Sodium 138   Potassium  5.0   Chloride 101   Calcium 9.7   Total Protein 7.2   Albumin 4.60   Globulin 2.6   Total Bilirubin 0.3   Alkaline Phosphatase 67   AST (SGOT) 16   ALT (SGPT) 14   Albumin/Globulin Ratio 1.8   BUN/Creatinine Ratio 14.4   Anion Gap 9.0      Comments are available for some flowsheets but are not being displayed.           CBC    CBC 12/6/22   WBC 5.51   RBC 4.64   Hemoglobin 13.6   Hematocrit 41.1   MCV 88.6   MCH 29.3   MCHC 33.1   RDW 13.5   Platelets 299               TSH    TSH 12/6/22   TSH 0.930           Most Recent A1C    HGBA1C Most Recent 12/6/22   Hemoglobin A1C 5.2                 Assessment and Plan     Diagnoses and all orders for this visit:    1. Annual physical exam (Primary)  No major concerns today.  We reviewed his medications.  BP appropriate, no changes.    We reviewed his labs from December, all normal.  Colon ca screening to start at 44 yo.  Prostate ca screening to start at 49 yo.  No lung ca screening, never smoker, encouraged to continue abstinence.  Discussed healthy diet and exercise recommendations.  F/u in 1 yr or sooner prn.    2. Primary hypertension  At goal today, continue amlodipine 10 mg daily and lisinopril 40 mg daily.  \    Follow Up     Return in about 1 year (around 1/6/2024) for Annual physical.

## 2023-01-11 RX ORDER — AMLODIPINE BESYLATE 10 MG/1
10 TABLET ORAL DAILY
Qty: 90 TABLET | Refills: 0 | Status: CANCELLED | OUTPATIENT
Start: 2023-01-11

## 2023-01-11 RX ORDER — AMLODIPINE BESYLATE 10 MG/1
10 TABLET ORAL DAILY
Qty: 90 TABLET | Refills: 0 | Status: SHIPPED | OUTPATIENT
Start: 2023-01-11

## 2023-01-13 ENCOUNTER — OFFICE VISIT (OUTPATIENT)
Dept: FAMILY MEDICINE CLINIC | Facility: CLINIC | Age: 23
End: 2023-01-13
Payer: COMMERCIAL

## 2023-01-13 VITALS
RESPIRATION RATE: 18 BRPM | WEIGHT: 256 LBS | DIASTOLIC BLOOD PRESSURE: 54 MMHG | OXYGEN SATURATION: 99 % | HEART RATE: 97 BPM | BODY MASS INDEX: 30.23 KG/M2 | SYSTOLIC BLOOD PRESSURE: 128 MMHG | HEIGHT: 77 IN

## 2023-01-13 DIAGNOSIS — J02.0 STREP SORE THROAT: Primary | ICD-10-CM

## 2023-01-13 LAB
EXPIRATION DATE: ABNORMAL
INTERNAL CONTROL: ABNORMAL
Lab: ABNORMAL
S PYO AG THROAT QL: POSITIVE

## 2023-01-13 PROCEDURE — 87880 STREP A ASSAY W/OPTIC: CPT | Performed by: NURSE PRACTITIONER

## 2023-01-13 PROCEDURE — 99213 OFFICE O/P EST LOW 20 MIN: CPT | Performed by: NURSE PRACTITIONER

## 2023-01-13 RX ORDER — AZITHROMYCIN 250 MG/1
TABLET, FILM COATED ORAL
Qty: 6 TABLET | Refills: 0 | Status: SHIPPED | OUTPATIENT
Start: 2023-01-13

## 2023-01-13 NOTE — PROGRESS NOTES
"Chief Complaint  Sore Throat    Subjective          Ashok Vinson presents to [facility]  History of Present Illness     Is here today with c/o sore throat since Monday  He is not having much by the way of other symptoms, he does endorse some sinus drainge but tells me that this is typical for this time of year    Review of Systems   Constitutional: Negative for appetite change, chills, diaphoresis and fever.   HENT: Positive for postnasal drip and sore throat. Negative for congestion and rhinorrhea.    Respiratory: Negative.  Negative for cough, shortness of breath and wheezing.    Cardiovascular: Negative.  Negative for chest pain.   Musculoskeletal: Negative.    Psychiatric/Behavioral: Positive for sleep disturbance.        Sore throat is disrupting his sleep     Is using ibuprofen, and throat lozenges with some relief    Objective   Vital Signs:  /54 (BP Location: Left arm, Patient Position: Sitting)   Pulse 97   Resp 18   Ht 195.6 cm (77.01\")   Wt 116 kg (256 lb)   SpO2 99%   BMI 30.35 kg/m²     BP Readings from Last 3 Encounters:   01/13/23 128/54   01/06/23 128/58   12/06/22 132/60        Wt Readings from Last 3 Encounters:   01/13/23 116 kg (256 lb)   01/06/23 113 kg (249 lb 3.2 oz)   12/06/22 116 kg (255 lb 9.6 oz)              Physical Exam  Vitals reviewed.   Constitutional:       Appearance: Normal appearance.   HENT:      Right Ear: Tympanic membrane and ear canal normal.      Left Ear: Tympanic membrane and ear canal normal.      Nose: Nose normal.      Mouth/Throat:      Mouth: Mucous membranes are moist.      Pharynx: Posterior oropharyngeal erythema present. No oropharyngeal exudate.   Neck:      Vascular: No carotid bruit.   Cardiovascular:      Rate and Rhythm: Normal rate and regular rhythm.      Pulses: Normal pulses.      Heart sounds: Normal heart sounds.   Pulmonary:      Effort: Pulmonary effort is normal.      Breath sounds: Normal breath " sounds.   Musculoskeletal:      Cervical back: Neck supple. No tenderness.   Lymphadenopathy:      Cervical: No cervical adenopathy.   Skin:     General: Skin is warm.   Neurological:      Mental Status: He is alert.        Result Review :                 Assessment and Plan    Diagnoses and all orders for this visit:    1. Strep sore throat (Primary)  -     POCT rapid strep A  -     azithromycin (Zithromax Z-Tima) 250 MG tablet; Take 2 tablets by mouth on day 1, then 1 tablet daily on days 2-5  Dispense: 6 tablet; Refill: 0           Follow Up   Return if symptoms worsen or fail to improve.  Patient was given instructions and counseling regarding his condition or for health maintenance advice. Please see specific information pulled into the AVS if appropriate.

## 2023-01-27 ENCOUNTER — TELEPHONE (OUTPATIENT)
Dept: FAMILY MEDICINE CLINIC | Facility: CLINIC | Age: 23
End: 2023-01-27
Payer: COMMERCIAL

## 2023-01-27 RX ORDER — AMOXICILLIN 500 MG/1
1000 CAPSULE ORAL 2 TIMES DAILY
Qty: 40 CAPSULE | Refills: 0 | Status: SHIPPED | OUTPATIENT
Start: 2023-01-27 | End: 2023-02-06

## 2023-01-27 NOTE — TELEPHONE ENCOUNTER
Provider: JEROME  Caller: PATIENT  Relationship to Patient: SELF  Pharmacy: [id number] [address] AT [facility] [phone] [facility] [phone]   Phone Number: [phone]    Reason for Call: PATIENT STATES HE DID FINISH THE PRESCRIBED MEDICATION FOR THE SORE THROAT AND IT DID SEEM TO HELP BUT NOW ITS COME BACK AND HE IS ASKING FOR ANOTHER ROUND.      When was the patient last seen: 01/13/23

## 2023-04-10 RX ORDER — LISINOPRIL 40 MG/1
40 TABLET ORAL DAILY
Qty: 90 TABLET | Refills: 3 | Status: SHIPPED | OUTPATIENT
Start: 2023-04-10

## 2023-04-21 ENCOUNTER — TELEPHONE (OUTPATIENT)
Dept: FAMILY MEDICINE CLINIC | Facility: CLINIC | Age: 23
End: 2023-04-21
Payer: COMMERCIAL

## 2023-04-21 ENCOUNTER — OFFICE VISIT (OUTPATIENT)
Dept: FAMILY MEDICINE CLINIC | Facility: CLINIC | Age: 23
End: 2023-04-21
Payer: COMMERCIAL

## 2023-04-21 VITALS — OXYGEN SATURATION: 99 % | HEART RATE: 77 BPM | HEIGHT: 77 IN | WEIGHT: 252.2 LBS | BODY MASS INDEX: 29.78 KG/M2

## 2023-04-21 DIAGNOSIS — L08.9 TOE INFECTION: ICD-10-CM

## 2023-04-21 DIAGNOSIS — L08.9 TOE INFECTION: Primary | ICD-10-CM

## 2023-04-21 PROCEDURE — 99213 OFFICE O/P EST LOW 20 MIN: CPT | Performed by: STUDENT IN AN ORGANIZED HEALTH CARE EDUCATION/TRAINING PROGRAM

## 2023-04-21 RX ORDER — CEPHALEXIN 500 MG/1
500 CAPSULE ORAL 2 TIMES DAILY
Qty: 10 CAPSULE | Refills: 0 | Status: SHIPPED | OUTPATIENT
Start: 2023-04-21 | End: 2023-04-21 | Stop reason: SDUPTHER

## 2023-04-21 RX ORDER — CEPHALEXIN 500 MG/1
500 CAPSULE ORAL 2 TIMES DAILY
Qty: 10 CAPSULE | Refills: 0 | Status: SHIPPED | OUTPATIENT
Start: 2023-04-21 | End: 2023-04-26

## 2023-04-21 NOTE — PROGRESS NOTES
"Chief Complaint  Chief Complaint   Patient presents with   • Toe Pain     CC: pt states he got a pedicure Sunday and they cut around his toe and now it is red and swollen and looking infected.     Subjective        Ashok Vinson is a 23 y.o. male who presents to [facility].    History of Present Illness  Pedicure on Sunday.  Next day noticed his right big toe was red and swollen.  He has had some drainage between the skin and the nail medially.  It is painful to walk on it and tender to touch.  No fevers.  He tried topical Neosporin, ibuprofen/Tylenol, Epsom salt soaks.    Objective   Pulse 77   Ht 195.6 cm (77\")   Wt 114 kg (252 lb 3.2 oz)   SpO2 99%   BMI 29.91 kg/m²     Estimated body mass index is 29.91 kg/m² as calculated from the following:    Height as of this encounter: 195.6 cm (77\").    Weight as of this encounter: 114 kg (252 lb 3.2 oz).     Physical Exam   GEN: In no acute distress, non toxic appearing  SKIN: Right big toe with erythema and swelling surrounding the nail with drainage between the skin and the nail medially.  Tender to touch surrounding the nail.    Result Review :              Assessment and Plan     Diagnoses and all orders for this visit:    1. Toe infection (Primary)  Exam consistent with likely infection, possible mild ingrown toenail.  Treat with Keflex 500 mg twice daily x5 days.  Tylenol/ibuprofen for pain/discomfort.  Tape skin away from nail to relieve pressure.  Can continue Epsom salt soaks as needed.  -     cephalexin (Keflex) 500 MG capsule; Take 1 capsule by mouth 2 (Two) Times a Day for 5 days.  Dispense: 10 capsule; Refill: 0         Follow Up   If no improvement  "

## 2023-04-21 NOTE — TELEPHONE ENCOUNTER
Caller: Ashok Vinson    Relationship to patient: Self    Best call back number: [phone]    Patient is needing: PATIENT WAS SEEN TODAY. 4/21, AT 10:30. WAS SUPPOSED TO HAVE A MEDICATION, cephalexin (Keflex) 500 MG capsule, SENT OVER. BUT THE PHARMACY THAT IT WAS SENT TO WAS CLOSED. THE PATIENT WAS WANTING TO KNOW IF HE COULD HAVE THIS PRESCRIPTION SENT OVER TO [address], [place] [address]    PH: [phone]

## 2023-05-22 RX ORDER — AMLODIPINE BESYLATE 10 MG/1
10 TABLET ORAL DAILY
Qty: 90 TABLET | Refills: 0 | Status: CANCELLED | OUTPATIENT
Start: 2023-05-22

## 2023-05-23 RX ORDER — AMLODIPINE BESYLATE 10 MG/1
10 TABLET ORAL DAILY
Qty: 90 TABLET | Refills: 0 | Status: SHIPPED | OUTPATIENT
Start: 2023-05-23

## 2023-08-23 RX ORDER — AMLODIPINE BESYLATE 10 MG/1
10 TABLET ORAL DAILY
Qty: 90 TABLET | Refills: 0 | Status: CANCELLED | OUTPATIENT
Start: 2023-08-23

## 2023-08-24 RX ORDER — AMLODIPINE BESYLATE 10 MG/1
10 TABLET ORAL DAILY
Qty: 90 TABLET | Refills: 0 | Status: SHIPPED | OUTPATIENT
Start: 2023-08-24

## 2023-11-26 RX ORDER — AMLODIPINE BESYLATE 10 MG/1
10 TABLET ORAL DAILY
Qty: 90 TABLET | Refills: 0 | Status: CANCELLED | OUTPATIENT
Start: 2023-11-26

## 2023-11-27 RX ORDER — AMLODIPINE BESYLATE 10 MG/1
10 TABLET ORAL DAILY
Qty: 90 TABLET | Refills: 0 | Status: SHIPPED | OUTPATIENT
Start: 2023-11-27

## 2024-01-12 ENCOUNTER — OFFICE VISIT (OUTPATIENT)
Dept: FAMILY MEDICINE CLINIC | Facility: CLINIC | Age: 24
End: 2024-01-12
Payer: COMMERCIAL

## 2024-01-12 ENCOUNTER — LAB (OUTPATIENT)
Dept: FAMILY MEDICINE CLINIC | Facility: CLINIC | Age: 24
End: 2024-01-12
Payer: COMMERCIAL

## 2024-01-12 VITALS
HEIGHT: 77 IN | RESPIRATION RATE: 18 BRPM | WEIGHT: 269 LBS | HEART RATE: 75 BPM | OXYGEN SATURATION: 99 % | BODY MASS INDEX: 31.76 KG/M2 | DIASTOLIC BLOOD PRESSURE: 70 MMHG | SYSTOLIC BLOOD PRESSURE: 139 MMHG

## 2024-01-12 DIAGNOSIS — I10 PRIMARY HYPERTENSION: ICD-10-CM

## 2024-01-12 DIAGNOSIS — Z00.00 ANNUAL PHYSICAL EXAM: Primary | ICD-10-CM

## 2024-01-12 LAB
ALBUMIN SERPL-MCNC: 4.5 G/DL (ref 3.5–5.2)
ALBUMIN/GLOB SERPL: 1.5 G/DL
ALP SERPL-CCNC: 72 U/L (ref 39–117)
ALT SERPL W P-5'-P-CCNC: 17 U/L (ref 1–41)
ANION GAP SERPL CALCULATED.3IONS-SCNC: 9 MMOL/L (ref 5–15)
AST SERPL-CCNC: 16 U/L (ref 1–40)
BILIRUB SERPL-MCNC: 0.5 MG/DL (ref 0–1.2)
BUN SERPL-MCNC: 13 MG/DL (ref 6–20)
BUN/CREAT SERPL: 15.7 (ref 7–25)
CALCIUM SPEC-SCNC: 9.3 MG/DL (ref 8.6–10.5)
CHLORIDE SERPL-SCNC: 102 MMOL/L (ref 98–107)
CHOLEST SERPL-MCNC: 157 MG/DL (ref 0–200)
CO2 SERPL-SCNC: 25 MMOL/L (ref 22–29)
CREAT SERPL-MCNC: 0.83 MG/DL (ref 0.76–1.27)
DEPRECATED RDW RBC AUTO: 40.4 FL (ref 37–54)
EGFRCR SERPLBLD CKD-EPI 2021: 124.6 ML/MIN/1.73
ERYTHROCYTE [DISTWIDTH] IN BLOOD BY AUTOMATED COUNT: 13 % (ref 12.3–15.4)
GLOBULIN UR ELPH-MCNC: 3.1 GM/DL
GLUCOSE SERPL-MCNC: 85 MG/DL (ref 65–99)
HBA1C MFR BLD: 5.3 % (ref 4.8–5.6)
HCT VFR BLD AUTO: 44.1 % (ref 37.5–51)
HDLC SERPL-MCNC: 49 MG/DL (ref 40–60)
HGB BLD-MCNC: 14.4 G/DL (ref 13–17.7)
LDLC SERPL CALC-MCNC: 101 MG/DL (ref 0–100)
LDLC/HDLC SERPL: 2.08 {RATIO}
MCH RBC QN AUTO: 27.7 PG (ref 26.6–33)
MCHC RBC AUTO-ENTMCNC: 32.7 G/DL (ref 31.5–35.7)
MCV RBC AUTO: 85 FL (ref 79–97)
PLATELET # BLD AUTO: 318 10*3/MM3 (ref 140–450)
PMV BLD AUTO: 10.6 FL (ref 6–12)
POTASSIUM SERPL-SCNC: 4.4 MMOL/L (ref 3.5–5.2)
PROT SERPL-MCNC: 7.6 G/DL (ref 6–8.5)
RBC # BLD AUTO: 5.19 10*6/MM3 (ref 4.14–5.8)
SODIUM SERPL-SCNC: 136 MMOL/L (ref 136–145)
TRIGL SERPL-MCNC: 30 MG/DL (ref 0–150)
TSH SERPL DL<=0.05 MIU/L-ACNC: 1.22 UIU/ML (ref 0.27–4.2)
VLDLC SERPL-MCNC: 7 MG/DL (ref 5–40)
WBC NRBC COR # BLD AUTO: 5.91 10*3/MM3 (ref 3.4–10.8)

## 2024-01-12 PROCEDURE — 99395 PREV VISIT EST AGE 18-39: CPT | Performed by: STUDENT IN AN ORGANIZED HEALTH CARE EDUCATION/TRAINING PROGRAM

## 2024-01-12 PROCEDURE — 80061 LIPID PANEL: CPT | Performed by: STUDENT IN AN ORGANIZED HEALTH CARE EDUCATION/TRAINING PROGRAM

## 2024-01-12 PROCEDURE — 83036 HEMOGLOBIN GLYCOSYLATED A1C: CPT | Performed by: STUDENT IN AN ORGANIZED HEALTH CARE EDUCATION/TRAINING PROGRAM

## 2024-01-12 PROCEDURE — 36415 COLL VENOUS BLD VENIPUNCTURE: CPT | Performed by: STUDENT IN AN ORGANIZED HEALTH CARE EDUCATION/TRAINING PROGRAM

## 2024-01-12 PROCEDURE — 80050 GENERAL HEALTH PANEL: CPT | Performed by: STUDENT IN AN ORGANIZED HEALTH CARE EDUCATION/TRAINING PROGRAM

## 2024-01-12 RX ORDER — LISINOPRIL 40 MG/1
40 TABLET ORAL DAILY
Qty: 90 TABLET | Refills: 3 | Status: SHIPPED | OUTPATIENT
Start: 2024-01-12

## 2024-01-12 NOTE — PROGRESS NOTES
"Chief Complaint  Chief Complaint   Patient presents with    Annual Exam     Subjective        Ashok Vinson is a 23 y.o. male who presents to [facility].    History of Present Illness  Here for annual physical.    Diet: decent intake of fruits and veggies, could be better; drinks mostly water, 1-2 sodas / day.  Exercise: very acytive at work but no regular exercise; he is a   Sleep: no concerns, 6-7 hrs    HTN on amlodipine 10 mg daily, lisinopril 40 mg daily.  BP is high normal today.      Objective   /70   Pulse 75   Resp 18   Ht 195.6 cm (77\")   Wt 122 kg (269 lb)   SpO2 99%   BMI 31.90 kg/m²     Estimated body mass index is 31.9 kg/m² as calculated from the following:    Height as of this encounter: 195.6 cm (77\").    Weight as of this encounter: 122 kg (269 lb).     Physical Exam   GEN: In no acute distress, non toxic appearing  HEENT: Pupils equal and reactive to light, sclera clear. Mucous membranes moist. Oropharynx without erythema or exudate. No cervical or submandibular lymphadenopathy.  TM wnl bilaterally.    CV: Regular rate and rhythm, no murmurs, 2+ peripheral pulses, No extremity edema.   RESP: Lungs clear to auscultation anteriorly and posteriorly in all lung fields bilaterally.  NEURO: AAO to person, place, and time. CN 2-12 intact grossly.  PSYCH: Affect normal, insight fair     PHQ-2 Depression Screening  Little interest or pleasure in doing things? 0-->not at all   Feeling down, depressed, or hopeless? 0-->not at all   PHQ-2 Total Score 0      Result Review :              Assessment and Plan     Diagnoses and all orders for this visit:    1. Annual physical exam (Primary)  Overall reassuring exam.  BP high normal today, recommended ambulatory monitoring.    Check labs as below.    Encourage regular exercise.  Encourage healthy diet w/ plenty of fruits and vegetables.    Colon ca screening to start at 45.  Prostate ca screening to start at " 50.  Next physical in 1 yr, f/u sooner prn.  -     CBC (No Diff)  -     Comprehensive Metabolic Panel  -     Hemoglobin A1c  -     Lipid Panel  -     TSH    2. Primary hypertension  Continue amlodipine 10 mg daily and lisinopril 40 mg daily.    -     lisinopril (PRINIVIL,ZESTRIL) 40 MG tablet; TAKE 1 TABLET BY MOUTH DAILY  Dispense: 90 tablet; Refill: 3       Follow Up     Return in about 1 year (around 1/12/2025) for Annual physical.

## 2024-01-15 ENCOUNTER — TELEPHONE (OUTPATIENT)
Dept: FAMILY MEDICINE CLINIC | Facility: CLINIC | Age: 24
End: 2024-01-15
Payer: COMMERCIAL

## 2024-01-15 NOTE — TELEPHONE ENCOUNTER
HUB to share. LM to return call.   ----- Message from Ric Morris, DO sent at 1/15/2024  8:00 AM EST -----  Please let Ashok know the following regarding his labs: his cholesterol is improved from his last check and all looks fine.  His thyroid function, kidney function, liver function blood sugar and blood counts are all normal.  Overall things look good and no changes need to be made at this time.

## 2024-02-23 RX ORDER — AMLODIPINE BESYLATE 10 MG/1
10 TABLET ORAL DAILY
Qty: 90 TABLET | Refills: 0 | Status: SHIPPED | OUTPATIENT
Start: 2024-02-23

## 2024-05-28 RX ORDER — AMLODIPINE BESYLATE 10 MG/1
10 TABLET ORAL DAILY
Qty: 90 TABLET | Refills: 0 | Status: SHIPPED | OUTPATIENT
Start: 2024-05-28

## 2024-07-18 RX ORDER — AMLODIPINE BESYLATE 10 MG/1
10 TABLET ORAL DAILY
Qty: 90 TABLET | Refills: 0 | Status: SHIPPED | OUTPATIENT
Start: 2024-07-18

## 2024-10-17 RX ORDER — AMLODIPINE BESYLATE 10 MG/1
10 TABLET ORAL DAILY
Qty: 90 TABLET | Refills: 0 | Status: CANCELLED | OUTPATIENT
Start: 2024-10-17

## 2024-10-18 RX ORDER — AMLODIPINE BESYLATE 10 MG/1
10 TABLET ORAL DAILY
Qty: 90 TABLET | Refills: 0 | Status: SHIPPED | OUTPATIENT
Start: 2024-10-18

## 2025-01-09 RX ORDER — AMLODIPINE BESYLATE 10 MG/1
10 TABLET ORAL DAILY
Qty: 90 TABLET | Refills: 0 | Status: CANCELLED | OUTPATIENT
Start: 2025-01-09

## 2025-01-09 RX ORDER — AMLODIPINE BESYLATE 10 MG/1
10 TABLET ORAL DAILY
Qty: 90 TABLET | Refills: 0 | Status: SHIPPED | OUTPATIENT
Start: 2025-01-09

## 2025-04-14 DIAGNOSIS — I10 PRIMARY HYPERTENSION: ICD-10-CM

## 2025-04-14 RX ORDER — LISINOPRIL 40 MG/1
40 TABLET ORAL DAILY
Qty: 90 TABLET | Refills: 0 | Status: SHIPPED | OUTPATIENT
Start: 2025-04-14

## 2025-04-14 RX ORDER — AMLODIPINE BESYLATE 10 MG/1
10 TABLET ORAL DAILY
Qty: 90 TABLET | Refills: 0 | Status: SHIPPED | OUTPATIENT
Start: 2025-04-14

## 2025-07-14 DIAGNOSIS — I10 PRIMARY HYPERTENSION: ICD-10-CM

## 2025-07-15 RX ORDER — LISINOPRIL 40 MG/1
40 TABLET ORAL DAILY
Qty: 90 TABLET | Refills: 0 | Status: SHIPPED | OUTPATIENT
Start: 2025-07-15

## 2025-07-15 RX ORDER — AMLODIPINE BESYLATE 10 MG/1
10 TABLET ORAL DAILY
Qty: 90 TABLET | Refills: 0 | Status: SHIPPED | OUTPATIENT
Start: 2025-07-15

## (undated) DEVICE — DRSNG WND BORDR/ADHS NONADHR/GZ LF 4X4IN STRL

## (undated) DEVICE — 3M™ STERI-STRIP™ REINFORCED ADHESIVE SKIN CLOSURES, R1547, 1/2 IN X 4 IN (12 MM X 100 MM), 6 STRIPS/ENVELOPE: Brand: 3M™ STERI-STRIP™

## (undated) DEVICE — COVADERM: Brand: DEROYAL

## (undated) DEVICE — UNDYED BRAIDED (POLYGLACTIN 910), SYNTHETIC ABSORBABLE SUTURE: Brand: COATED VICRYL

## (undated) DEVICE — GOWN,REINFORCE,POLY,SIRUS,BREATH SLV,XLG: Brand: MEDLINE

## (undated) DEVICE — GLV SURG BIOGEL LTX PF 7

## (undated) DEVICE — DECANTER: Brand: UNBRANDED

## (undated) DEVICE — KT SURG TURNOVER 050

## (undated) DEVICE — SOLUTION,WATER,IRRIGATION,1000ML,STERILE: Brand: MEDLINE

## (undated) DEVICE — PENCL EVAC ULTRAVAC SMOKE W/BLD

## (undated) DEVICE — PK MINOR LAPAROTOMY 50

## (undated) DEVICE — ADHS SKIN PREMIERPRO EXOFIN TOPICAL HI/VISC .5ML

## (undated) DEVICE — ADHS LIQ MASTISOL 2/3ML

## (undated) DEVICE — SUT VIC 3/0 SH 27IN J416H

## (undated) DEVICE — UNDERGLV SURG BIOGEL INDICATOR LTX PF 7